# Patient Record
Sex: FEMALE | Race: WHITE | NOT HISPANIC OR LATINO | Employment: OTHER | URBAN - METROPOLITAN AREA
[De-identification: names, ages, dates, MRNs, and addresses within clinical notes are randomized per-mention and may not be internally consistent; named-entity substitution may affect disease eponyms.]

---

## 2018-02-15 ENCOUNTER — CONVERSION ENCOUNTER (OUTPATIENT)
Dept: MAMMOGRAPHY | Facility: HOSPITAL | Age: 63
End: 2018-02-15

## 2018-06-28 ENCOUNTER — CONVERSION ENCOUNTER (OUTPATIENT)
Dept: FAMILY MEDICINE CLINIC | Facility: CLINIC | Age: 63
End: 2018-06-28

## 2018-06-28 ENCOUNTER — OFFICE VISIT CONVERTED (OUTPATIENT)
Dept: FAMILY MEDICINE CLINIC | Facility: CLINIC | Age: 63
End: 2018-06-28
Attending: NURSE PRACTITIONER

## 2018-08-28 ENCOUNTER — OFFICE VISIT CONVERTED (OUTPATIENT)
Dept: FAMILY MEDICINE CLINIC | Facility: CLINIC | Age: 63
End: 2018-08-28
Attending: NURSE PRACTITIONER

## 2018-08-29 ENCOUNTER — OFFICE VISIT CONVERTED (OUTPATIENT)
Dept: GASTROENTEROLOGY | Facility: CLINIC | Age: 63
End: 2018-08-29
Attending: PHYSICIAN ASSISTANT

## 2018-11-13 ENCOUNTER — OFFICE VISIT CONVERTED (OUTPATIENT)
Dept: GASTROENTEROLOGY | Facility: CLINIC | Age: 63
End: 2018-11-13
Attending: PHYSICIAN ASSISTANT

## 2018-12-18 ENCOUNTER — OFFICE VISIT CONVERTED (OUTPATIENT)
Dept: FAMILY MEDICINE CLINIC | Facility: CLINIC | Age: 63
End: 2018-12-18
Attending: NURSE PRACTITIONER

## 2018-12-31 ENCOUNTER — OFFICE VISIT CONVERTED (OUTPATIENT)
Dept: FAMILY MEDICINE CLINIC | Facility: CLINIC | Age: 63
End: 2018-12-31
Attending: NURSE PRACTITIONER

## 2019-03-18 ENCOUNTER — OFFICE VISIT CONVERTED (OUTPATIENT)
Dept: GASTROENTEROLOGY | Facility: CLINIC | Age: 64
End: 2019-03-18
Attending: PHYSICIAN ASSISTANT

## 2019-03-20 ENCOUNTER — OFFICE VISIT CONVERTED (OUTPATIENT)
Dept: FAMILY MEDICINE CLINIC | Facility: CLINIC | Age: 64
End: 2019-03-20
Attending: NURSE PRACTITIONER

## 2019-03-20 ENCOUNTER — HOSPITAL ENCOUNTER (OUTPATIENT)
Dept: FAMILY MEDICINE CLINIC | Facility: CLINIC | Age: 64
Discharge: HOME | End: 2019-03-20
Attending: NURSE PRACTITIONER

## 2019-03-22 LAB
AMOXICILLIN+CLAV SUSC ISLT: 8
AMPICILLIN SUSC ISLT: >=32
AMPICILLIN+SULBAC SUSC ISLT: 16
BACTERIA UR CULT: ABNORMAL
CEFAZOLIN SUSC ISLT: <=4
CEFEPIME SUSC ISLT: <=1
CEFTAZIDIME SUSC ISLT: <=1
CEFTRIAXONE SUSC ISLT: <=1
CEFUROXIME ORAL SUSC ISLT: <=1
CEFUROXIME PARENTER SUSC ISLT: <=1
CIPROFLOXACIN SUSC ISLT: <=0.25
ERTAPENEM SUSC ISLT: <=0.5
GENTAMICIN SUSC ISLT: <=1
LEVOFLOXACIN SUSC ISLT: <=0.12
NITROFURANTOIN SUSC ISLT: <=16
TETRACYCLINE SUSC ISLT: <=1
TMP SMX SUSC ISLT: <=20
TOBRAMYCIN SUSC ISLT: <=1

## 2019-04-24 ENCOUNTER — CONVERSION ENCOUNTER (OUTPATIENT)
Dept: FAMILY MEDICINE CLINIC | Facility: CLINIC | Age: 64
End: 2019-04-24

## 2019-04-24 ENCOUNTER — OFFICE VISIT CONVERTED (OUTPATIENT)
Dept: FAMILY MEDICINE CLINIC | Facility: CLINIC | Age: 64
End: 2019-04-24
Attending: NURSE PRACTITIONER

## 2019-05-02 ENCOUNTER — HOSPITAL ENCOUNTER (OUTPATIENT)
Dept: MAMMOGRAPHY | Facility: HOSPITAL | Age: 64
Discharge: HOME | End: 2019-05-02
Attending: NURSE PRACTITIONER

## 2019-05-06 ENCOUNTER — OFFICE VISIT CONVERTED (OUTPATIENT)
Dept: FAMILY MEDICINE CLINIC | Facility: CLINIC | Age: 64
End: 2019-05-06
Attending: NURSE PRACTITIONER

## 2019-05-06 ENCOUNTER — HOSPITAL ENCOUNTER (OUTPATIENT)
Dept: FAMILY MEDICINE CLINIC | Facility: CLINIC | Age: 64
Discharge: HOME | End: 2019-05-06
Attending: NURSE PRACTITIONER

## 2019-05-06 ENCOUNTER — CONVERSION ENCOUNTER (OUTPATIENT)
Dept: FAMILY MEDICINE CLINIC | Facility: CLINIC | Age: 64
End: 2019-05-06

## 2019-05-10 LAB
CONV LAST MENSTURAL PERIOD: NORMAL
SPECIMEN SOURCE: NORMAL
SPECIMEN SOURCE: NORMAL
THIN PREP CVX: NORMAL

## 2019-05-13 LAB — HPV HYBRID CAPTURE HIGH RISK: NEGATIVE

## 2019-05-31 ENCOUNTER — HOSPITAL ENCOUNTER (OUTPATIENT)
Dept: GENERAL RADIOLOGY | Facility: HOSPITAL | Age: 64
Discharge: HOME | End: 2019-05-31

## 2019-05-31 LAB
ALBUMIN SERPL-MCNC: 4.3 G/DL (ref 3.5–5)
ALBUMIN/GLOB SERPL: 1.5 {RATIO} (ref 1.4–2.6)
ALP SERPL-CCNC: 114 U/L (ref 43–160)
ALT SERPL-CCNC: 14 U/L (ref 10–40)
ANION GAP SERPL CALC-SCNC: 16 MMOL/L (ref 8–19)
AST SERPL-CCNC: 20 U/L (ref 15–50)
BASOPHILS # BLD AUTO: 0.07 10*3/UL (ref 0–0.2)
BASOPHILS NFR BLD AUTO: 1.3 % (ref 0–3)
BILIRUB SERPL-MCNC: 0.56 MG/DL (ref 0.2–1.3)
BUN SERPL-MCNC: 16 MG/DL (ref 5–25)
BUN/CREAT SERPL: 17 {RATIO} (ref 6–20)
CALCIUM SERPL-MCNC: 9.9 MG/DL (ref 8.7–10.4)
CHLORIDE SERPL-SCNC: 103 MMOL/L (ref 99–111)
CONV ABS IMM GRAN: 0.01 10*3/UL (ref 0–0.2)
CONV CO2: 28 MMOL/L (ref 22–32)
CONV IMMATURE GRAN: 0.2 % (ref 0–1.8)
CONV TOTAL PROTEIN: 7.2 G/DL (ref 6.3–8.2)
CREAT UR-MCNC: 0.95 MG/DL (ref 0.5–0.9)
DEPRECATED RDW RBC AUTO: 42.8 FL (ref 36.4–46.3)
EOSINOPHIL # BLD AUTO: 0.29 10*3/UL (ref 0–0.7)
EOSINOPHIL # BLD AUTO: 5.5 % (ref 0–7)
ERYTHROCYTE [DISTWIDTH] IN BLOOD BY AUTOMATED COUNT: 13 % (ref 11.7–14.4)
GFR SERPLBLD BASED ON 1.73 SQ M-ARVRAT: >60 ML/MIN/{1.73_M2}
GLOBULIN UR ELPH-MCNC: 2.9 G/DL (ref 2–3.5)
GLUCOSE SERPL-MCNC: 100 MG/DL (ref 65–99)
HBA1C MFR BLD: 12.8 G/DL (ref 12–16)
HCT VFR BLD AUTO: 38.6 % (ref 37–47)
LYMPHOCYTES # BLD AUTO: 1.49 10*3/UL (ref 1–5)
MCH RBC QN AUTO: 29.7 PG (ref 27–31)
MCHC RBC AUTO-ENTMCNC: 33.2 G/DL (ref 33–37)
MCV RBC AUTO: 89.6 FL (ref 81–99)
MONOCYTES # BLD AUTO: 0.52 10*3/UL (ref 0.2–1.2)
MONOCYTES NFR BLD AUTO: 9.9 % (ref 3–10)
NEUTROPHILS # BLD AUTO: 2.86 10*3/UL (ref 2–8)
NEUTROPHILS NFR BLD AUTO: 54.7 % (ref 30–85)
NRBC CBCN: 0 % (ref 0–0.7)
OSMOLALITY SERPL CALC.SUM OF ELEC: 295 MOSM/KG (ref 273–304)
PLATELET # BLD AUTO: 243 10*3/UL (ref 130–400)
PMV BLD AUTO: 10.5 FL (ref 9.4–12.3)
POTASSIUM SERPL-SCNC: 5.2 MMOL/L (ref 3.5–5.3)
RBC # BLD AUTO: 4.31 10*6/UL (ref 4.2–5.4)
SODIUM SERPL-SCNC: 142 MMOL/L (ref 135–147)
VARIANT LYMPHS NFR BLD MANUAL: 28.4 % (ref 20–45)
WBC # BLD AUTO: 5.24 10*3/UL (ref 4.8–10.8)

## 2019-08-14 ENCOUNTER — OFFICE VISIT CONVERTED (OUTPATIENT)
Dept: FAMILY MEDICINE CLINIC | Facility: CLINIC | Age: 64
End: 2019-08-14
Attending: NURSE PRACTITIONER

## 2019-08-14 ENCOUNTER — CONVERSION ENCOUNTER (OUTPATIENT)
Dept: FAMILY MEDICINE CLINIC | Facility: CLINIC | Age: 64
End: 2019-08-14

## 2019-08-14 ENCOUNTER — HOSPITAL ENCOUNTER (OUTPATIENT)
Dept: FAMILY MEDICINE CLINIC | Facility: CLINIC | Age: 64
Discharge: HOME | End: 2019-08-14
Attending: NURSE PRACTITIONER

## 2019-08-14 LAB
ALBUMIN SERPL-MCNC: 4.3 G/DL (ref 3.5–5)
ALBUMIN/GLOB SERPL: 1.4 {RATIO} (ref 1.4–2.6)
ALP SERPL-CCNC: 83 U/L (ref 43–160)
ALT SERPL-CCNC: 13 U/L (ref 10–40)
ANION GAP SERPL CALC-SCNC: 15 MMOL/L (ref 8–19)
AST SERPL-CCNC: 21 U/L (ref 15–50)
BASOPHILS # BLD AUTO: 0.08 10*3/UL (ref 0–0.2)
BASOPHILS NFR BLD AUTO: 1.6 % (ref 0–3)
BILIRUB SERPL-MCNC: 0.51 MG/DL (ref 0.2–1.3)
BUN SERPL-MCNC: 15 MG/DL (ref 5–25)
BUN/CREAT SERPL: 21 {RATIO} (ref 6–20)
CALCIUM SERPL-MCNC: 9.6 MG/DL (ref 8.7–10.4)
CHLORIDE SERPL-SCNC: 102 MMOL/L (ref 99–111)
CHOLEST SERPL-MCNC: 156 MG/DL (ref 107–200)
CHOLEST/HDLC SERPL: 3.2 {RATIO} (ref 3–6)
CONV ABS IMM GRAN: 0.01 10*3/UL (ref 0–0.2)
CONV CO2: 27 MMOL/L (ref 22–32)
CONV IMMATURE GRAN: 0.2 % (ref 0–1.8)
CONV TOTAL PROTEIN: 7.3 G/DL (ref 6.3–8.2)
CREAT UR-MCNC: 0.72 MG/DL (ref 0.5–0.9)
DEPRECATED RDW RBC AUTO: 47.8 FL (ref 36.4–46.3)
EOSINOPHIL # BLD AUTO: 0.35 10*3/UL (ref 0–0.7)
EOSINOPHIL # BLD AUTO: 7 % (ref 0–7)
ERYTHROCYTE [DISTWIDTH] IN BLOOD BY AUTOMATED COUNT: 14.1 % (ref 11.7–14.4)
GFR SERPLBLD BASED ON 1.73 SQ M-ARVRAT: >60 ML/MIN/{1.73_M2}
GLOBULIN UR ELPH-MCNC: 3 G/DL (ref 2–3.5)
GLUCOSE SERPL-MCNC: 94 MG/DL (ref 65–99)
HCT VFR BLD AUTO: 40.1 % (ref 37–47)
HDLC SERPL-MCNC: 49 MG/DL (ref 40–60)
HGB BLD-MCNC: 12.8 G/DL (ref 12–16)
LDLC SERPL CALC-MCNC: 84 MG/DL (ref 70–100)
LYMPHOCYTES # BLD AUTO: 1.32 10*3/UL (ref 1–5)
LYMPHOCYTES NFR BLD AUTO: 26.5 % (ref 20–45)
MCH RBC QN AUTO: 29.4 PG (ref 27–31)
MCHC RBC AUTO-ENTMCNC: 31.9 G/DL (ref 33–37)
MCV RBC AUTO: 92.2 FL (ref 81–99)
MONOCYTES # BLD AUTO: 0.51 10*3/UL (ref 0.2–1.2)
MONOCYTES NFR BLD AUTO: 10.2 % (ref 3–10)
NEUTROPHILS # BLD AUTO: 2.72 10*3/UL (ref 2–8)
NEUTROPHILS NFR BLD AUTO: 54.5 % (ref 30–85)
NRBC CBCN: 0 % (ref 0–0.7)
OSMOLALITY SERPL CALC.SUM OF ELEC: 291 MOSM/KG (ref 273–304)
PLATELET # BLD AUTO: 258 10*3/UL (ref 130–400)
PMV BLD AUTO: 12.6 FL (ref 9.4–12.3)
POTASSIUM SERPL-SCNC: 4 MMOL/L (ref 3.5–5.3)
RBC # BLD AUTO: 4.35 10*6/UL (ref 4.2–5.4)
SODIUM SERPL-SCNC: 140 MMOL/L (ref 135–147)
TRIGL SERPL-MCNC: 117 MG/DL (ref 40–150)
VLDLC SERPL-MCNC: 23 MG/DL (ref 5–37)
WBC # BLD AUTO: 4.99 10*3/UL (ref 4.8–10.8)

## 2019-08-15 LAB — 25(OH)D3 SERPL-MCNC: 46.9 NG/ML (ref 30–100)

## 2019-10-10 ENCOUNTER — OFFICE VISIT CONVERTED (OUTPATIENT)
Dept: OTOLARYNGOLOGY | Facility: CLINIC | Age: 64
End: 2019-10-10
Attending: OTOLARYNGOLOGY

## 2019-10-10 ENCOUNTER — CONVERSION ENCOUNTER (OUTPATIENT)
Dept: OTOLARYNGOLOGY | Facility: CLINIC | Age: 64
End: 2019-10-10

## 2020-02-14 ENCOUNTER — OFFICE VISIT CONVERTED (OUTPATIENT)
Dept: FAMILY MEDICINE CLINIC | Facility: CLINIC | Age: 65
End: 2020-02-14
Attending: NURSE PRACTITIONER

## 2020-03-31 ENCOUNTER — TELEMEDICINE CONVERTED (OUTPATIENT)
Dept: FAMILY MEDICINE CLINIC | Facility: CLINIC | Age: 65
End: 2020-03-31
Attending: NURSE PRACTITIONER

## 2020-05-15 ENCOUNTER — TELEPHONE CONVERTED (OUTPATIENT)
Dept: FAMILY MEDICINE CLINIC | Facility: CLINIC | Age: 65
End: 2020-05-15
Attending: NURSE PRACTITIONER

## 2020-07-06 ENCOUNTER — CONVERSION ENCOUNTER (OUTPATIENT)
Dept: FAMILY MEDICINE CLINIC | Facility: CLINIC | Age: 65
End: 2020-07-06

## 2020-07-06 ENCOUNTER — OFFICE VISIT CONVERTED (OUTPATIENT)
Dept: FAMILY MEDICINE CLINIC | Facility: CLINIC | Age: 65
End: 2020-07-06
Attending: NURSE PRACTITIONER

## 2020-07-06 ENCOUNTER — HOSPITAL ENCOUNTER (OUTPATIENT)
Dept: FAMILY MEDICINE CLINIC | Facility: CLINIC | Age: 65
Discharge: HOME | End: 2020-07-06
Attending: NURSE PRACTITIONER

## 2020-07-06 LAB
ALBUMIN SERPL-MCNC: 4.1 G/DL (ref 3.5–5)
ALBUMIN/GLOB SERPL: 1.6 {RATIO} (ref 1.4–2.6)
ALP SERPL-CCNC: 80 U/L (ref 43–160)
ALT SERPL-CCNC: 14 U/L (ref 10–40)
ANION GAP SERPL CALC-SCNC: 14 MMOL/L (ref 8–19)
AST SERPL-CCNC: 20 U/L (ref 15–50)
BASOPHILS # BLD AUTO: 0.08 10*3/UL (ref 0–0.2)
BASOPHILS NFR BLD AUTO: 1.5 % (ref 0–3)
BILIRUB SERPL-MCNC: 0.3 MG/DL (ref 0.2–1.3)
BUN SERPL-MCNC: 16 MG/DL (ref 5–25)
BUN/CREAT SERPL: 19 {RATIO} (ref 6–20)
CALCIUM SERPL-MCNC: 9.2 MG/DL (ref 8.7–10.4)
CHLORIDE SERPL-SCNC: 106 MMOL/L (ref 99–111)
CHOLEST SERPL-MCNC: 145 MG/DL (ref 107–200)
CHOLEST/HDLC SERPL: 2.7 {RATIO} (ref 3–6)
CONV ABS IMM GRAN: 0.01 10*3/UL (ref 0–0.2)
CONV CO2: 24 MMOL/L (ref 22–32)
CONV IMMATURE GRAN: 0.2 % (ref 0–1.8)
CONV TOTAL PROTEIN: 6.6 G/DL (ref 6.3–8.2)
CREAT UR-MCNC: 0.86 MG/DL (ref 0.5–0.9)
DEPRECATED RDW RBC AUTO: 45.3 FL (ref 36.4–46.3)
EOSINOPHIL # BLD AUTO: 0.31 10*3/UL (ref 0–0.7)
EOSINOPHIL # BLD AUTO: 5.9 % (ref 0–7)
ERYTHROCYTE [DISTWIDTH] IN BLOOD BY AUTOMATED COUNT: 13.2 % (ref 11.7–14.4)
GFR SERPLBLD BASED ON 1.73 SQ M-ARVRAT: >60 ML/MIN/{1.73_M2}
GLOBULIN UR ELPH-MCNC: 2.5 G/DL (ref 2–3.5)
GLUCOSE SERPL-MCNC: 106 MG/DL (ref 65–99)
HCT VFR BLD AUTO: 34.5 % (ref 37–47)
HDLC SERPL-MCNC: 54 MG/DL (ref 40–60)
HGB BLD-MCNC: 11.1 G/DL (ref 12–16)
LDLC SERPL CALC-MCNC: 74 MG/DL (ref 70–100)
LYMPHOCYTES # BLD AUTO: 1.28 10*3/UL (ref 1–5)
LYMPHOCYTES NFR BLD AUTO: 24.3 % (ref 20–45)
MCH RBC QN AUTO: 30.5 PG (ref 27–31)
MCHC RBC AUTO-ENTMCNC: 32.2 G/DL (ref 33–37)
MCV RBC AUTO: 94.8 FL (ref 81–99)
MONOCYTES # BLD AUTO: 0.44 10*3/UL (ref 0.2–1.2)
MONOCYTES NFR BLD AUTO: 8.3 % (ref 3–10)
NEUTROPHILS # BLD AUTO: 3.15 10*3/UL (ref 2–8)
NEUTROPHILS NFR BLD AUTO: 59.8 % (ref 30–85)
NRBC CBCN: 0 % (ref 0–0.7)
OSMOLALITY SERPL CALC.SUM OF ELEC: 292 MOSM/KG (ref 273–304)
PLATELET # BLD AUTO: 232 10*3/UL (ref 130–400)
PMV BLD AUTO: 12.3 FL (ref 9.4–12.3)
POTASSIUM SERPL-SCNC: 4 MMOL/L (ref 3.5–5.3)
RBC # BLD AUTO: 3.64 10*6/UL (ref 4.2–5.4)
SODIUM SERPL-SCNC: 140 MMOL/L (ref 135–147)
TRIGL SERPL-MCNC: 87 MG/DL (ref 40–150)
VLDLC SERPL-MCNC: 17 MG/DL (ref 5–37)
WBC # BLD AUTO: 5.27 10*3/UL (ref 4.8–10.8)

## 2020-07-07 LAB
EST. AVERAGE GLUCOSE BLD GHB EST-MCNC: 108 MG/DL
FOLATE SERPL-MCNC: 14.6 NG/ML (ref 4.8–20)
HBA1C MFR BLD: 5.4 % (ref 3.5–5.7)
T4 FREE SERPL-MCNC: 1 NG/DL (ref 0.9–1.8)
TSH SERPL-ACNC: 3.21 M[IU]/L (ref 0.27–4.2)
VIT B12 SERPL-MCNC: 502 PG/ML (ref 211–911)

## 2020-07-13 ENCOUNTER — CONVERSION ENCOUNTER (OUTPATIENT)
Dept: FAMILY MEDICINE CLINIC | Facility: CLINIC | Age: 65
End: 2020-07-13

## 2020-07-13 ENCOUNTER — HOSPITAL ENCOUNTER (OUTPATIENT)
Dept: FAMILY MEDICINE CLINIC | Facility: CLINIC | Age: 65
Discharge: HOME | End: 2020-07-13
Attending: NURSE PRACTITIONER

## 2020-07-13 ENCOUNTER — OFFICE VISIT CONVERTED (OUTPATIENT)
Dept: FAMILY MEDICINE CLINIC | Facility: CLINIC | Age: 65
End: 2020-07-13
Attending: NURSE PRACTITIONER

## 2020-07-13 LAB
BASOPHILS # BLD AUTO: 0.08 10*3/UL (ref 0–0.2)
BASOPHILS NFR BLD AUTO: 1.6 % (ref 0–3)
CONV ABS IMM GRAN: 0.01 10*3/UL (ref 0–0.2)
CONV IMMATURE GRAN: 0.2 % (ref 0–1.8)
DEPRECATED RDW RBC AUTO: 44 FL (ref 36.4–46.3)
EOSINOPHIL # BLD AUTO: 0.22 10*3/UL (ref 0–0.7)
EOSINOPHIL # BLD AUTO: 4.4 % (ref 0–7)
ERYTHROCYTE [DISTWIDTH] IN BLOOD BY AUTOMATED COUNT: 12.8 % (ref 11.7–14.4)
HCT VFR BLD AUTO: 37.7 % (ref 37–47)
HGB BLD-MCNC: 12.2 G/DL (ref 12–16)
LYMPHOCYTES # BLD AUTO: 1.39 10*3/UL (ref 1–5)
LYMPHOCYTES NFR BLD AUTO: 27.5 % (ref 20–45)
MCH RBC QN AUTO: 30.5 PG (ref 27–31)
MCHC RBC AUTO-ENTMCNC: 32.4 G/DL (ref 33–37)
MCV RBC AUTO: 94.3 FL (ref 81–99)
MONOCYTES # BLD AUTO: 0.49 10*3/UL (ref 0.2–1.2)
MONOCYTES NFR BLD AUTO: 9.7 % (ref 3–10)
NEUTROPHILS # BLD AUTO: 2.86 10*3/UL (ref 2–8)
NEUTROPHILS NFR BLD AUTO: 56.6 % (ref 30–85)
NRBC CBCN: 0 % (ref 0–0.7)
PLATELET # BLD AUTO: 266 10*3/UL (ref 130–400)
PMV BLD AUTO: 12.3 FL (ref 9.4–12.3)
RBC # BLD AUTO: 4 10*6/UL (ref 4.2–5.4)
WBC # BLD AUTO: 5.05 10*3/UL (ref 4.8–10.8)

## 2020-07-14 LAB
25(OH)D3 SERPL-MCNC: 49.8 NG/ML (ref 30–100)
FERRITIN SERPL-MCNC: 21 NG/ML (ref 10–200)
IRON SATN MFR SERPL: 20 % (ref 20–55)
IRON SERPL-MCNC: 62 UG/DL (ref 60–170)
TIBC SERPL-MCNC: 312 UG/DL (ref 245–450)
TRANSFERRIN SERPL-MCNC: 218 MG/DL (ref 250–380)

## 2020-07-16 ENCOUNTER — HOSPITAL ENCOUNTER (OUTPATIENT)
Dept: FAMILY MEDICINE CLINIC | Facility: CLINIC | Age: 65
Discharge: HOME | End: 2020-07-16
Attending: NURSE PRACTITIONER

## 2020-07-16 ENCOUNTER — OFFICE VISIT CONVERTED (OUTPATIENT)
Dept: FAMILY MEDICINE CLINIC | Facility: CLINIC | Age: 65
End: 2020-07-16
Attending: NURSE PRACTITIONER

## 2020-07-19 LAB
BACTERIA SPEC AEROBE CULT: ABNORMAL
CIPROFLOXACIN SUSC ISLT: <=0.5
CLINDAMYCIN SUSC ISLT: >=4
DAPTOMYCIN SUSC ISLT: 0.25
DOXYCYCLINE SUSC ISLT: <=0.5
ERYTHROMYCIN SUSC ISLT: <=0.25
GENTAMICIN SUSC ISLT: <=0.5
LEVOFLOXACIN SUSC ISLT: 0.25
OXACILLIN SUSC ISLT: <=0.25
RIFAMPIN SUSC ISLT: <=0.5
TETRACYCLINE SUSC ISLT: <=1
TIGECYCLINE SUSC ISLT: <=0.12
TMP SMX SUSC ISLT: <=10
VANCOMYCIN SUSC ISLT: 1

## 2020-07-20 ENCOUNTER — OFFICE VISIT CONVERTED (OUTPATIENT)
Dept: FAMILY MEDICINE CLINIC | Facility: CLINIC | Age: 65
End: 2020-07-20
Attending: NURSE PRACTITIONER

## 2020-07-20 ENCOUNTER — CONVERSION ENCOUNTER (OUTPATIENT)
Dept: FAMILY MEDICINE CLINIC | Facility: CLINIC | Age: 65
End: 2020-07-20

## 2020-08-24 ENCOUNTER — OFFICE VISIT CONVERTED (OUTPATIENT)
Dept: FAMILY MEDICINE CLINIC | Facility: CLINIC | Age: 65
End: 2020-08-24
Attending: NURSE PRACTITIONER

## 2020-08-24 ENCOUNTER — CONVERSION ENCOUNTER (OUTPATIENT)
Dept: FAMILY MEDICINE CLINIC | Facility: CLINIC | Age: 65
End: 2020-08-24

## 2020-08-27 ENCOUNTER — HOSPITAL ENCOUNTER (OUTPATIENT)
Dept: FAMILY MEDICINE CLINIC | Facility: CLINIC | Age: 65
Discharge: HOME | End: 2020-08-27
Attending: NURSE PRACTITIONER

## 2020-08-27 LAB
APPEARANCE UR: ABNORMAL
BILIRUB UR QL: NEGATIVE
COLOR UR: YELLOW
CONV BACTERIA: ABNORMAL
CONV COLLECTION SOURCE (UA): ABNORMAL
CONV HYALINE CASTS IN URINE MICRO: ABNORMAL /[LPF]
CONV UROBILINOGEN IN URINE BY AUTOMATED TEST STRIP: 1 {EHRLICHU}/DL (ref 0.1–1)
GLUCOSE UR QL: NEGATIVE MG/DL
HGB UR QL STRIP: NEGATIVE
KETONES UR QL STRIP: NEGATIVE MG/DL
LEUKOCYTE ESTERASE UR QL STRIP: ABNORMAL
NITRITE UR QL STRIP: POSITIVE
PH UR STRIP.AUTO: 7 [PH] (ref 5–8)
PROT UR QL: NEGATIVE MG/DL
RBC #/AREA URNS HPF: ABNORMAL /[HPF]
SP GR UR: 1.01 (ref 1–1.03)
WBC #/AREA URNS HPF: ABNORMAL /[HPF]

## 2020-08-29 LAB — BACTERIA SPEC AEROBE CULT: NORMAL

## 2020-09-24 ENCOUNTER — HOSPITAL ENCOUNTER (OUTPATIENT)
Dept: MAMMOGRAPHY | Facility: HOSPITAL | Age: 65
Discharge: HOME | End: 2020-09-24
Attending: NURSE PRACTITIONER

## 2020-12-17 ENCOUNTER — HOSPITAL ENCOUNTER (OUTPATIENT)
Dept: PREADMISSION TESTING | Facility: HOSPITAL | Age: 65
Discharge: HOME | End: 2020-12-17
Attending: ORTHOPAEDIC SURGERY

## 2020-12-17 ENCOUNTER — OFFICE VISIT CONVERTED (OUTPATIENT)
Dept: ORTHOPEDIC SURGERY | Facility: CLINIC | Age: 65
End: 2020-12-17
Attending: ORTHOPAEDIC SURGERY

## 2020-12-17 LAB — SARS-COV-2 RNA SPEC QL NAA+PROBE: NOT DETECTED

## 2020-12-18 ENCOUNTER — HOSPITAL ENCOUNTER (OUTPATIENT)
Dept: PERIOP | Facility: HOSPITAL | Age: 65
Setting detail: HOSPITAL OUTPATIENT SURGERY
Discharge: HOME | End: 2020-12-18
Attending: ORTHOPAEDIC SURGERY

## 2021-01-05 ENCOUNTER — OFFICE VISIT CONVERTED (OUTPATIENT)
Dept: ORTHOPEDIC SURGERY | Facility: CLINIC | Age: 66
End: 2021-01-05
Attending: PHYSICIAN ASSISTANT

## 2021-01-05 ENCOUNTER — CONVERSION ENCOUNTER (OUTPATIENT)
Dept: ORTHOPEDIC SURGERY | Facility: CLINIC | Age: 66
End: 2021-01-05

## 2021-01-26 ENCOUNTER — OFFICE VISIT CONVERTED (OUTPATIENT)
Dept: ORTHOPEDIC SURGERY | Facility: CLINIC | Age: 66
End: 2021-01-26
Attending: PHYSICIAN ASSISTANT

## 2021-02-25 ENCOUNTER — OFFICE VISIT CONVERTED (OUTPATIENT)
Dept: ORTHOPEDIC SURGERY | Facility: CLINIC | Age: 66
End: 2021-02-25
Attending: ORTHOPAEDIC SURGERY

## 2021-03-01 ENCOUNTER — HOSPITAL ENCOUNTER (OUTPATIENT)
Dept: OTHER | Facility: HOSPITAL | Age: 66
Setting detail: RECURRING SERIES
Discharge: HOME | End: 2021-03-29
Attending: ORTHOPAEDIC SURGERY

## 2021-03-09 ENCOUNTER — OFFICE VISIT CONVERTED (OUTPATIENT)
Dept: FAMILY MEDICINE CLINIC | Facility: CLINIC | Age: 66
End: 2021-03-09
Attending: NURSE PRACTITIONER

## 2021-03-09 ENCOUNTER — CONVERSION ENCOUNTER (OUTPATIENT)
Dept: FAMILY MEDICINE CLINIC | Facility: CLINIC | Age: 66
End: 2021-03-09

## 2021-03-09 ENCOUNTER — HOSPITAL ENCOUNTER (OUTPATIENT)
Dept: FAMILY MEDICINE CLINIC | Facility: CLINIC | Age: 66
Discharge: HOME | End: 2021-03-09
Attending: NURSE PRACTITIONER

## 2021-03-09 LAB
ALBUMIN SERPL-MCNC: 4.1 G/DL (ref 3.5–5)
ALBUMIN/GLOB SERPL: 1.4 {RATIO} (ref 1.4–2.6)
ALP SERPL-CCNC: 116 U/L (ref 43–160)
ALT SERPL-CCNC: 15 U/L (ref 10–40)
ANION GAP SERPL CALC-SCNC: 14 MMOL/L (ref 8–19)
AST SERPL-CCNC: 23 U/L (ref 15–50)
BASOPHILS # BLD AUTO: 0.08 10*3/UL (ref 0–0.2)
BASOPHILS NFR BLD AUTO: 1.4 % (ref 0–3)
BILIRUB SERPL-MCNC: 0.23 MG/DL (ref 0.2–1.3)
BUN SERPL-MCNC: 24 MG/DL (ref 5–25)
BUN/CREAT SERPL: 30 {RATIO} (ref 6–20)
CALCIUM SERPL-MCNC: 9.4 MG/DL (ref 8.7–10.4)
CHLORIDE SERPL-SCNC: 102 MMOL/L (ref 99–111)
CHOLEST SERPL-MCNC: 171 MG/DL (ref 107–200)
CHOLEST/HDLC SERPL: 3.4 {RATIO} (ref 3–6)
CONV ABS IMM GRAN: 0.02 10*3/UL (ref 0–0.2)
CONV CO2: 28 MMOL/L (ref 22–32)
CONV IMMATURE GRAN: 0.4 % (ref 0–1.8)
CONV TOTAL PROTEIN: 7.1 G/DL (ref 6.3–8.2)
CREAT UR-MCNC: 0.81 MG/DL (ref 0.5–0.9)
DEPRECATED RDW RBC AUTO: 44.4 FL (ref 36.4–46.3)
EOSINOPHIL # BLD AUTO: 0.22 10*3/UL (ref 0–0.7)
EOSINOPHIL # BLD AUTO: 3.9 % (ref 0–7)
ERYTHROCYTE [DISTWIDTH] IN BLOOD BY AUTOMATED COUNT: 13.2 % (ref 11.7–14.4)
GFR SERPLBLD BASED ON 1.73 SQ M-ARVRAT: >60 ML/MIN/{1.73_M2}
GLOBULIN UR ELPH-MCNC: 3 G/DL (ref 2–3.5)
GLUCOSE SERPL-MCNC: 92 MG/DL (ref 65–99)
HCT VFR BLD AUTO: 40 % (ref 37–47)
HDLC SERPL-MCNC: 50 MG/DL (ref 40–60)
HGB BLD-MCNC: 13.1 G/DL (ref 12–16)
IRON SATN MFR SERPL: 27 % (ref 20–55)
IRON SERPL-MCNC: 80 UG/DL (ref 60–170)
LDLC SERPL CALC-MCNC: 81 MG/DL (ref 70–100)
LYMPHOCYTES # BLD AUTO: 1.83 10*3/UL (ref 1–5)
LYMPHOCYTES NFR BLD AUTO: 32.4 % (ref 20–45)
MCH RBC QN AUTO: 29.7 PG (ref 27–31)
MCHC RBC AUTO-ENTMCNC: 32.8 G/DL (ref 33–37)
MCV RBC AUTO: 90.7 FL (ref 81–99)
MONOCYTES # BLD AUTO: 0.6 10*3/UL (ref 0.2–1.2)
MONOCYTES NFR BLD AUTO: 10.6 % (ref 3–10)
NEUTROPHILS # BLD AUTO: 2.89 10*3/UL (ref 2–8)
NEUTROPHILS NFR BLD AUTO: 51.3 % (ref 30–85)
NRBC CBCN: 0 % (ref 0–0.7)
OSMOLALITY SERPL CALC.SUM OF ELEC: 294 MOSM/KG (ref 273–304)
PLATELET # BLD AUTO: 268 10*3/UL (ref 130–400)
PMV BLD AUTO: 11.9 FL (ref 9.4–12.3)
POTASSIUM SERPL-SCNC: 4.2 MMOL/L (ref 3.5–5.3)
RBC # BLD AUTO: 4.41 10*6/UL (ref 4.2–5.4)
SODIUM SERPL-SCNC: 140 MMOL/L (ref 135–147)
TIBC SERPL-MCNC: 299 UG/DL (ref 245–450)
TRANSFERRIN SERPL-MCNC: 209 MG/DL (ref 250–380)
TRIGL SERPL-MCNC: 202 MG/DL (ref 40–150)
VLDLC SERPL-MCNC: 40 MG/DL (ref 5–37)
WBC # BLD AUTO: 5.64 10*3/UL (ref 4.8–10.8)

## 2021-03-10 LAB
FERRITIN SERPL-MCNC: 23 NG/ML (ref 10–200)
FOLATE SERPL-MCNC: 12.4 NG/ML (ref 4.8–20)
VIT B12 SERPL-MCNC: 510 PG/ML (ref 211–911)

## 2021-04-01 ENCOUNTER — HOSPITAL ENCOUNTER (OUTPATIENT)
Dept: VACCINE CLINIC | Facility: HOSPITAL | Age: 66
Discharge: HOME | End: 2021-04-01
Attending: INTERNAL MEDICINE

## 2021-04-22 ENCOUNTER — HOSPITAL ENCOUNTER (OUTPATIENT)
Dept: VACCINE CLINIC | Facility: HOSPITAL | Age: 66
Discharge: HOME | End: 2021-04-22
Attending: INTERNAL MEDICINE

## 2021-05-10 NOTE — H&P
History and Physical      Patient Name: Vanna Gil   Patient ID: [id number]   Sex: Female   YOB: 1955    Primary Care Provider: Ibeth REINA   Referring Provider: Ibeth REINA    Visit Date: December 17, 2020    Provider: Son Quiroz MD   Location: [facility]   Location Address: [address] [place] [address]  [id number]   Location Phone: [phone]          Chief Complaint  · Right Wrist Injury      History Of Present Illness  Vanna Gil is a 65 year old /White female who presents today to [facility].      Patient presents today for an evaluation of right wrist pain. Yesterday she slipped on grass, on an outstretched hand, resulting in an injury. Patient went to the ED and had X-rays done which revealed a right distal radius fracture that was displaced. Patient had post-reduction films. Patient states moderate amount of pain and presents today in a splint and a sling. Patient denies any numbness and tingling.       Past Medical History  Anemia; Anxiety; Arthritis; Asthma; Depression; Depression; Essential hypertension; Hearing loss; Hemorrhoids; Hernia; Hyperlipidemia; Hypertension; OAB (overactive bladder); Reflux Disease; Shortness of Breath         Past Surgical History  Appendectomy; Breast biopsy, left breast; Colonoscopy; EGD; Hernia Repair; Knee Replacement; MENISCUS TEAR; Spinal Surgery; Tonsilectomy         Medication List  atorvastatin 40 mg oral tablet; calcium carbonate 500 mg calcium (1,250 mg) oral tablet; carvedilol 3.125 mg oral tablet; Detrol LA 4 mg oral capsule,extended release 24hr; hydroxyzine HCl 25 mg oral tablet; loratadine 10 mg oral tablet; Macrobid 100 mg oral capsule; nifedipine 60 mg oral tablet extended release; Vitamin D3 50 mcg (2,000 unit) oral capsule; Zoloft 50 mg oral tablet         Allergy List  Aleve; Levaquin; Medrol       Allergies Reconciled  Family Medical History  - No Family History of  "Colorectal Cancer; Family history of breast cancer; Family history of heart disease         Social History  Alcohol (Current some day); Sedentary; Tobacco (Former)         Immunizations  Name Date Admin   Influenza 11/02/2020   Influenza 10/23/2019   Influenza 11/02/2018   Tvgdzlllx47 07/20/2020   Prevnar 13 09/07/2017         Review of Systems  · Constitutional  o Denies  o : fever, chills, weight loss  · Cardiovascular  o Denies  o : chest pain, shortness of breath  · Gastrointestinal  o Denies  o : liver disease, heartburn, nausea, blood in stools  · Genitourinary  o Denies  o : painful urination, blood in urine  · Integument  o Denies  o : rash, itching  · Neurologic  o Denies  o : headache, weakness, loss of consciousness  · Musculoskeletal  o Denies  o : painful, swollen joints  · Psychiatric  o Denies  o : drug/alcohol addiction, anxiety, depression      Vitals  Date Time BP Position Site L\R Cuff Size HR RR TEMP (F) WT  HT  BMI kg/m2 BSA m2 O2 Sat FR L/min FiO2        12/17/2020 08:53 AM      69 - R   184lbs 0oz 5'  1\" 34.77 1.9 96 %            Physical Examination  · Constitutional  o Appearance  o : well developed, well-nourished, no obvious deformities present  · Head and Face  o Head  o :   § Inspection  § : normocephalic  o Face  o :   § Inspection  § : no facial lesions  · Eyes  o Conjunctivae  o : conjunctivae normal  o Sclerae  o : sclerae white  · Ears, Nose, Mouth and Throat  o Ears  o :   § External Ears  § : appearance within normal limits  § Hearing  § : intact  o Nose  o :   § External Nose  § : appearance normal  · Neck  o Inspection/Palpation  o : normal appearance  o Range of Motion  o : full range of motion  · Respiratory  o Respiratory Effort  o : breathing unlabored  o Inspection of Chest  o : normal appearance  o Auscultation of Lungs  o : no audible wheezing or rales  · Cardiovascular  o Heart  o : regular rate  · Gastrointestinal  o Abdominal Examination  o : soft and " non-tender  · Skin and Subcutaneous Tissue  o General Inspection  o : intact, no rashes  · Psychiatric  o General  o : Alert and oriented x3  o Judgement and Insight  o : judgment and insight intact  o Mood and Affect  o : mood normal, affect appropriate  · Right Wrist  o Inspection  o : Sensation grossly intact. Neurovascular intact. Skin intact. Patient able to wiggle fingers. Good capillary refill. Moderate swelling. Tenderness over the radius. 2+ radial and ulnar pulses.   · Imaging  o Imaging  o : 12/16/20 [xray] 1. Acute comminuted and mildly impacted fracture of the distal radius with marked dorsal angulation at the fracture site. [POST REDUCTION]:Post reduction images submitted with overlying splint with demonstration of improved alignment of the distal radius and ulna as detailed above.           Assessment  · Right distal radius fracture     814.01  · Right wrist pain     719.43/M25.531      Plan  · Medications  o Medications have been Reconciled  o Transition of Care or Provider Policy  · Instructions  o Dr. Quiroz saw and examined the patient and agrees with plan.   o X-rays reviewed by Dr. Quiroz.  o Reviewed the patient's Past Medical, Social, and Family history as well as the ROS at today's visit, no changes.  o Call or return if worsening symptoms.  o Discussed surgery.  o Risks/benefits discussed with patient including, but not limited to: infection, bleeding, neurovascular damage, malunion, nonunion, aesthetic deformity, need for further surgery, and death.  o Surgery pamphlet given.  o Follow Up Post-op.  o This note was transcribed by Solange Rodgers. ulises/keila  o Discussed treatment plans and diagnosis. We discussed open reduction and internal fixation of right distal radius fracture and patient wishes to proceed.             Electronically Signed by: Solange Rodgers-, Other -Author on December 17, 2020 09:52:03 AM  Electronically Co-signed by: Son Quiroz MD -Reviewer on December 18,  2020 03:16:12 PM

## 2021-05-12 NOTE — PROGRESS NOTES
Quick Note      Patient Name: Vanna Gil   Patient ID: [id number]   Sex: Female   YOB: 1955    Primary Care Provider: Ibeth REINA   Referring Provider: Ibeth REINA    Visit Date: March 31, 2020    Provider: LATOSHA Patten   Location: [facility]   Location Address: [address] [place] [address], [address]  [id number]   Location Phone: [phone]          History Of Present Illness  TELEHEALTH VISIT  Chief Complaint: Anxiety and depression   Vanna Gil is a 64 year old /White female who is presenting for evaluation via telehealth visit. Verbal consent obtained before beginning visit.   Provider spent 19 minutes with the patient during telehealth visit.   The following staff were present during this visit: Juan David Horvath CMA and LATOSHA Curry   Past Medical History/Overview of Patient Symptoms     She is complaining of increasing anxiety and depression.  She was seen at the ER on 3/27/2024 chest tightness, all her tests were negative.  She feels that she is more anxious lately due to the pandemic.  She has a history of depression and anxiety and was started on Zoloft 25 mg on her last visit on 2/14/2020.  She states it has helped with her depression, states she does not feel as depressed.  She is seeing a therapist and states her therapist noticed that she has improved.  No suicidal or homicidal ideation.    ER records were reviewed, labs within normal limits, chest x-ray within normal limits, EKG within normal limits.           Assessment  · Depression     311/F32.9  · Anxiety     300.02/F41.1    Problems Reconciled  Plan  · Orders  o Physician Telephone Evaluation, 11-20 minutes (55391) - 300.02/F41.1, 311/F32.9 - 03/31/2020  · Medications  o hydroxyzine HCl 25 mg oral tablet   SIG: take 1 tablet (25 mg) by oral route 2 times per day as needed for anxiety   DISP: (180) tablets with 0 refills  Prescribed on 03/31/2020     o Zoloft 50  mg oral tablet   SIG: take 1 tablet (50 mg) by oral route once daily for 90 days   DISP: (90) tablets with 0 refills  Adjusted on 03/31/2020     · Instructions  o Patient was given an SSRI/SSNRI medication and warned of possible side effects of the medication including potential for increased risk of suicidal thoughts and feelings. Patient was instructed that if they begin to exhibit any of these effects they will discontinue the medication immediately and contact our office or the ER ASAP.  o Plan Of Care:   o Patient instructed to seek medical attention urgently for new or worsening symptoms.  o Patient was educated/instructed on their diagnosis, treatment and medications.  o Patient instructed/educated on their diet and exercise program.  o Call the office with any concerns or questions.  · Disposition  o Return to clinic in 6 weeks     Depression anxiety not well controlled, will increase Zoloft 50 mg and add hydroxyzine 25 mg twice daily as needed anxiety.  Discussed with patient possibility of drowsiness with hydroxyzine and not to take it if she is going to be driving.             Electronically Signed by: LATOSHA Patten -Author on March 31, 2020 10:41:22 AM

## 2021-05-13 NOTE — PROGRESS NOTES
Progress Note      Patient Name: Vanna Gil   Patient ID: [id number]   Sex: Female   YOB: 1955    Primary Care Provider: Ibeth REINA   Referring Provider: Ibeth REINA    Visit Date: August 24, 2020    Provider: LATOSHA Patten   Location: [facility]   Location Address: [address] [place] [address], [address] [place] [address]  [id number]   Location Phone: [phone]          Chief Complaint  · medication refills  · still feels lightheaded      History Of Present Illness  Vanna Gil is a 65 year old /White female who presents for evaluation and treatment of:      Follow-up and med refills.    She states she is still experiencing lightheadedness.  She seems to have difficulty hearing today.  She denies any earaches.  She states she has seen ENT in the past.  She declines to be referred to ENT again at this time.    Hypertension: Blood pressure stable 116/72, on carvedilol 3.125 mg twice daily and nifedipine 60 mg daily.    She is complaining that her urine is cloudy and wants to have her urine checked.  She denies any dysuria.  However, she is unable to urinate while in the office today.    She is also complaining of loose stools for the past week.       Past Medical History  Disease Name Date Onset Notes   Anemia --  --    Anxiety --  --    Arthritis --  --    Asthma --  --    Depression --  --    Depression 02/14/2020 --    Essential hypertension 02/14/2020 --    Hearing loss --  --    Hemorrhoids --  --    Hernia --  --    Hyperlipidemia --  --    Hypertension --  --    OAB (overactive bladder) 02/14/2020 --    Reflux Disease --  --    Shortness of Breath --  --          Past Surgical History  Procedure Name Date Notes   Appendectomy --  --    Breast biopsy, left breast --  --    Colonoscopy 2018 9/20/2018- [facility] Repeat in 5 years   EGD 2018    Hernia Repair --  --    Knee Replacement --  --    MENISCUS TEAR --  --    Spinal Surgery --  --    Tonsilectomy --   --          Medication List  Name Date Started Instructions   atorvastatin 40 mg oral tablet 08/24/2020 take 1 tablet (40 mg) by oral route once daily at bedtime for 90 days   calcium carbonate 500 mg calcium (1,250 mg) oral tablet 02/14/2020 take 1 tablet by oral route 2 times a day for 90 days   carvedilol 3.125 mg oral tablet 08/24/2020 take 1 tablet (3.125 mg) by oral route 2 times per day with food for 90 days   Detrol LA 4 mg oral capsule,extended release 24hr 08/24/2020 take 1 capsule (4 mg) by oral route once daily for 90 days   hydroxyzine HCl 25 mg oral tablet 03/31/2020 take 1 tablet (25 mg) by oral route 2 times per day as needed for anxiety   loratadine 10 mg oral tablet 07/06/2020 take 1 tablet (10 mg) by oral route once daily for 90 days   nifedipine 60 mg oral tablet extended release 08/24/2020 take 1 tablet (60 mg) by oral route once daily for 90 days   Vitamin D3 50 mcg (2,000 unit) oral capsule 08/24/2020 take 1 capsule by oral route daily for 90 days   Zoloft 50 mg oral tablet 07/13/2020 take 1 tablet (50 mg) by oral route once daily for 90 days         Allergy List  Allergen Name Date Reaction Notes   Aleve --  --  --    Levaquin --  --  --    Medrol --  --  --          Family Medical History  Disease Name Relative/Age Notes   - No Family History of Colorectal Cancer  --    Family history of breast cancer Mother/   --    Family history of heart disease Brother/  Father/   --          Social History  Finding Status Start/Stop Quantity Notes   Alcohol Current some day --/-- --  --    Sedentary --  --/-- --  --    Tobacco Former --/-- --  --          Immunizations  NameDate Admin Mfg Trade Name Lot Number Route Inj VIS Given VIS Publication   Ratluzcmb76/23/2019 PMC Fluzone Quadrivalent [id number] IM LD 10/23/2019    Comments: Patient tolerated injection well, left in stable condition.   Zwitcexxy20/02/2018 PMC Fluzone Quadrivalent [id number] IM RD 11/02/2018 08/07/2015   Comments: Patient tolerated  "well. Left stable.   Irruwpkhb2905/20/2020 MSD PNEUMOVAX 23 [id number]  RD 07/20/2020    Comments: Patient tolerated injection well, left in stable condition.   Prevnar 1309/07/2017 [facility] PREVNAR 13 [id number]  RD 09/07/2017 11/05/2015   Comments: Patient tolerated inj well and left the office in stable condition.         Review of Systems  · Constitutional  o Denies  o : fever, fatigue, weight loss, weight gain  · HENT  o Admits  o : lightheadedness, hearing loss  o Denies  o : headaches, sinus pain, sore throat, ear pain, ear fullness  · Cardiovascular  o Denies  o : lower extremity edema, claudication, chest pressure, palpitations  · Respiratory  o Denies  o : shortness of breath, wheezing, cough, hemoptysis, dyspnea on exertion  · Gastrointestinal  o Admits  o : diarrhea  o Denies  o : nausea, vomiting, constipation, abdominal pain  · Genitourinary  o Admits  o : frequency, change in urine color  o Denies  o : urgency, dysuria      Vitals  Date Time BP Position Site L\R Cuff Size HR RR TEMP (F) WT  HT  BMI kg/m2 BSA m2 O2 Sat        08/24/2020 11:09 /72 Sitting    75 - R  97.7 186lbs 2oz 5'  1\" 35.17 1.91 95 %          Physical Examination  · Constitutional  o Appearance  o : no acute distress, well-nourished  · Head and Face  o Head  o :   § Inspection  § : atraumatic, normocephalic  · Ears, Nose, Mouth and Throat  o Ears  o :   § External Ears  § : normal  § Otoscopic Examination  § : tympanic membrane dull in appearance-bilaterally   · Neck  o Thyroid  o : gland size normal, nontender, no nodules or masses present on palpation, symmetric  · Respiratory  o Respiratory Effort  o : breathing comfortably, symmetric chest rise  o Auscultation of Lungs  o : clear to asculatation bilaterally, no wheezes, rales, or rhonchii  · Cardiovascular  o Heart  o :   § Auscultation of Heart  § : regular rate and rhythm, no murmurs, rubs, or gallops  o Peripheral Vascular System  o :   § Extremities  § : no " edema  · Lymphatic  o Neck  o : no lymphadenopathy present  · Neurologic  o Mental Status Examination  o :   § Orientation  § : grossly oriented to person, place and time  o Gait and Station  o :   § Gait Screening  § : normal gait  · Psychiatric  o General  o : normal mood and affect              Assessment  · Diarrhea     787.91/R19.7  · Essential hypertension     401.9/I10  · Dizziness     780.4/R42  · Cloudy urine     791.9/R82.90    Problems Reconciled  Plan  · Orders  o Giardia and Cryptosporidium Enzyme Immunoassay [facility] (10086, 27613) - 787.91/R19.7 - 08/24/2020  o Stool culture and sensitivity (87367) - 787.91/R19.7 - 08/24/2020  o C difficile Toxigenic Assay (PCR) [facility] (86244) - 787.91/R19.7 - 08/24/2020  o Lactoferrin (Fecal) Qualitative (28964) - 787.91/R19.7 - 08/24/2020  o Urinalysis with Reflex Microscopy if abnormal ([facility]) (53959) - 791.9/R82.90 - 08/24/2020  o ACO-39: Current medications updated and reviewed () - - 08/24/2020  o ACO-19: Colorectal cancer screening results documented and reviewed (3017F) - - 08/24/2020 09/20/18 Repeat in 5 years  · Medications  o atorvastatin 40 mg oral tablet   SIG: take 1 tablet (40 mg) by oral route once daily at bedtime for 90 days   DISP: (90) tablet with 3 refills  Adjusted on 08/24/2020     o carvedilol 3.125 mg oral tablet   SIG: take 1 tablet (3.125 mg) by oral route 2 times per day with food for 90 days   DISP: (180) tablet with 3 refills  Adjusted on 08/24/2020     o Detrol LA 4 mg oral capsule,extended release 24hr   SIG: take 1 capsule (4 mg) by oral route once daily for 90 days   DISP: (90) capsules with 3 refills  Adjusted on 08/24/2020     o nifedipine 60 mg oral tablet extended release   SIG: take 1 tablet (60 mg) by oral route once daily for 90 days   DISP: (90) tablet with 3 refills  Adjusted on 08/24/2020     o Vitamin D3 50 mcg (2,000 unit) oral capsule   SIG: take 1 capsule by oral route daily for 90 days   DISP: (90) capsules with 3  refills  Adjusted on 08/24/2020     o Medications have been Reconciled  o Transition of Care or Provider Policy  · Instructions  o BRAT diet, Avoid dairy products.  o Patient was educated/instructed on their diagnosis, treatment and medications prior to discharge from the clinic today.  o Patient instructed to seek medical attention urgently for new or worsening symptoms.  o Call the office with any concerns or questions.  · Disposition  o Return to clinic in 3 months     Patient will return for a urinalysis since she is unable to urinate while in the office today.  Orders for stool specimens, patient given stool collection kit.    Recommended referral to ENT, patient declines.             Electronically Signed by: LATOSHA Patten -Author on August 24, 2020 08:49:45 PM

## 2021-05-13 NOTE — PROGRESS NOTES
Progress Note      Patient Name: Vanna Gil   Patient ID: [id number]   Sex: Female   YOB: 1955    Primary Care Provider: Ibeth REINA   Referring Provider: Ibeth REINA    Visit Date: July 13, 2020    Provider: LATOSHA Patten   Location: [facility]   Location Address: [address] [place] [address], [address]  [id number]   Location Phone: [phone]          Chief Complaint  · 1 week follow up      History Of Present Illness  Vanna Gil is a 65 year old /White female who presents for evaluation and treatment of:      1 week follow-up on dizziness and lab results.    She was started on loratadine 10 mg daily for allergies and fluid on her tympanic membranes.  She states her dizziness has improved, states she does still have some dizziness but not near as much.    Lab results reviewed:  Hemoglobin 11.1 hematocrit 34.5%, vitamin B12 502, folic acid 14.6, mildly elevated glucose 106, A1c within normal limits at 5.4%  She did have some bleeding after she cut her hand 2 weeks ago.    She is due for mammogram and she has never had a bone density scan.  History of vitamin D deficiency: She does take vitamin D3 2000 units and she takes calcium supplements.    Hypertension: Blood pressure is much improved today at 142/84, on carvedilol 3.125 mg twice daily and nifedipine 60 mg once daily.    Hyperlipidemia: Her cholesterol was at goal on atorvastatin 40 mg daily.    History depression/anxiety: She is stable on Zoloft 50 mg once daily and needs a refill.       Past Medical History  Disease Name Date Onset Notes   Anemia --  --    Anxiety --  --    Arthritis --  --    Asthma --  --    Depression --  --    Depression 02/14/2020 --    Essential hypertension 02/14/2020 --    Hearing loss --  --    Hemorrhoids --  --    Hernia --  --    Hyperlipidemia --  --    Hypertension --  --    OAB (overactive bladder) 02/14/2020 --    Reflux Disease --  --     Shortness of Breath --  --          Past Surgical History  Procedure Name Date Notes   Appendectomy --  --    Breast biopsy, left breast --  --    Colonoscopy 2018 9/20/2018- [facility] Repeat in 5 years   EGD 2018    Hernia Repair --  --    Knee Replacement --  --    MENISCUS TEAR --  --    Spinal Surgery --  --    Tonsilectomy --  --          Medication List  Name Date Started Instructions   atorvastatin 40 mg oral tablet 02/14/2020 take 1 tablet (40 mg) by oral route once daily at bedtime for 90 days   calcium carbonate 500 mg calcium (1,250 mg) oral tablet 02/14/2020 take 1 tablet by oral route 2 times a day for 90 days   carvedilol 3.125 mg oral tablet 02/14/2020 take 1 tablet (3.125 mg) by oral route 2 times per day with food for 90 days   Detrol LA 4 mg oral capsule,extended release 24hr 02/14/2020 take 1 capsule (4 mg) by oral route once daily for 90 days   hydroxyzine HCl 25 mg oral tablet 03/31/2020 take 1 tablet (25 mg) by oral route 2 times per day as needed for anxiety   loratadine 10 mg oral tablet 07/06/2020 take 1 tablet (10 mg) by oral route once daily for 90 days   nifedipine 60 mg oral tablet extended release 02/14/2020 take 1 tablet (60 mg) by oral route once daily for 90 days   Vitamin D3 2,000 unit oral capsule 02/14/2020 take 1 capsule by oral route daily for 90 days   Zoloft 50 mg oral tablet 07/13/2020 take 1 tablet (50 mg) by oral route once daily for 90 days         Allergy List  Allergen Name Date Reaction Notes   Aleve --  --  --    Levaquin --  --  --    Medrol --  --  --          Family Medical History  Disease Name Relative/Age Notes   - No Family History of Colorectal Cancer  --    Family history of breast cancer Mother/   --    Family history of heart disease Brother/  Father/   --          Social History  Finding Status Start/Stop Quantity Notes   Alcohol Current some day --/-- --  --    Sedentary --  --/-- --  --    Tobacco Former --/-- --  --          Immunizations  NameDate Admin [facility]  "Trade Name Lot Number Route Inj VIS Given VIS Publication   Xuhydlwbb23/23/2019 PMC Fluzone Quadrivalent [id number] IM LD 10/23/2019    Comments: Patient tolerated injection well, left in stable condition.   Myrfvsoev32/02/2018 PMC Fluzone Quadrivalent [id number] IM RD 11/02/2018 08/07/2015   Comments: Patient tolerated well. Left stable.   Prevnar 1309/07/2017 WAL PREVNAR 13 [id number] IM RD 09/07/2017 11/05/2015   Comments: Patient tolerated inj well and left the office in stable condition.         Review of Systems  · Constitutional  o Denies  o : fever, fatigue, weight loss, weight gain  · HENT  o Denies  o : lightheadedness, sore throat, ear pain, ear fullness  · Cardiovascular  o Denies  o : lower extremity edema, claudication, chest pressure, palpitations  · Respiratory  o Denies  o : shortness of breath, wheezing, cough, hemoptysis, dyspnea on exertion  · Gastrointestinal  o Denies  o : nausea, vomiting, diarrhea, constipation, abdominal pain  · Integument  o Denies  o : rash, itching  · Psychiatric  o Denies  o : anxiety, depression, suicidal ideation, homicidal ideation      Vitals  Date Time BP Position Site L\R Cuff Size HR RR TEMP (F) WT  HT  BMI kg/m2 BSA m2 O2 Sat        07/13/2020 11:12 /84 Sitting    73 - R  97.5 186lbs 6oz 5'  1\" 35.21 1.91 96 %          Physical Examination  · Constitutional  o Appearance  o : no acute distress, well-nourished  · Head and Face  o Head  o :   § Inspection  § : atraumatic, normocephalic  · Ears, Nose, Mouth and Throat  o Ears  o :   § External Ears  § : normal  § Otoscopic Examination  § : tympanic membrane dull in appearance-bilaterally   · Respiratory  o Respiratory Effort  o : breathing comfortably, symmetric chest rise  o Auscultation of Lungs  o : clear to asculatation bilaterally, no wheezes, rales, or rhonchii  · Cardiovascular  o Heart  o :   § Auscultation of Heart  § : regular rate and rhythm, no murmurs, rubs, or gallops  o Peripheral Vascular System  o : "   § Extremities  § : no edema  · Skin and Subcutaneous Tissue  o General Inspection  o : incision healing well left hand  · Neurologic  o Mental Status Examination  o :   § Orientation  § : grossly oriented to person, place and time  o Gait and Station  o :   § Gait Screening  § : normal gait  · Psychiatric  o General  o : normal mood and affect  o Presence of Abnormal Thoughts  o : no hallucinations, no delusions present, no psychotic thoughts, no homicidal ideation, no suicidal ideation, no evidence of obsessional thinking          Assessment  · Visit for screening mammogram     V76.12/Z12.31  · Anemia     285.9/D64.9  · Depression     311/F32.9  · Essential hypertension     401.9/I10  · Post-menopause     V49.81/Z78.0  · Vitamin D deficiency     268.9/E55.9    Problems Reconciled  Plan  · Orders  o Screening Mammography; Bilateral 3D (12479, , 01146) - V76.12/Z12.31 - 07/13/2020   Schedule with DEXA  o CBC with Auto Diff [facility] (52099) - 285.9/D64.9 - 07/13/2020  o Iron panel (iron, TIBC, transferrin saturation) (06675, 08717, 39171) - 285.9/D64.9 - 07/13/2020  o Ferritin ser/plas (13914) - 285.9/D64.9 - 07/13/2020  o DEXA Bone Density, 1 or more sites, axial skeleton [facility] (32045) - V49.81/Z78.0 - 07/13/2020   Schedule with mammo  o Vitamin D Level (62601) - V49.81/Z78.0, 268.9/E55.9 - 07/13/2020  o ACO - Pt declines to or was not able to provide an Advance Care Plan or name a Surrogate Decision Maker (1124F) - - 07/13/2020  o ACO-39: Current medications updated and reviewed () - - 07/13/2020  o ACO-13: Fall Risk Screening with no falls in past year or only one fall without injury in the past year (1101F) - - 07/13/2020   1 fall  · Medications  o Zoloft 50 mg oral tablet   SIG: take 1 tablet (50 mg) by oral route once daily for 90 days   DISP: (90) tablets with 3 refills  Adjusted on 07/13/2020     o Medications have been Reconciled  o Transition of Care or Provider Policy  · Instructions  o Patient advised  to monitor blood pressure (B/P) at home and journal readings. Patient informed that a B/P reading at home of more than 130/80 is considered hypertension. For readings greater mukw782/90 or higher patient is advised to follow up in the office with readings for management. Patient advised to limit sodium intake.  o Stop taking calcium supplements for at least 48 hours prior to your exam. Failure to stop supplements could alter results, and the radiologists will require you to reschedule your test.  o Patient was educated/instructed on their diagnosis, treatment and medications prior to discharge from the clinic today.  o Patient instructed to seek medical attention urgently for new or worsening symptoms.  o Call the office with any concerns or questions.  · Disposition  o Return to clinic in 3 months     We will check labs today to include iron profile and ferritin and repeat CBC.    Patient schedule annual Medicare wellness visit.             Electronically Signed by: LATOSHA Patten -Author on July 13, 2020 01:17:57 PM

## 2021-05-13 NOTE — PROGRESS NOTES
Progress Note      Patient Name: Vanna Gil   Patient ID: [id number]   Sex: Female   YOB: 1955    Primary Care Provider: Ibeth REINA   Referring Provider: Ibeth REINA    Visit Date: July 20, 2020    Provider: LATOSHA Patten   Location: [facility]   Location Address: [address] [place] [address], [address]  [id number]   Location Phone: [phone]          Chief Complaint  · Welcome to Medicare Visit      History Of Present Illness  The patient is a 65 year old /White female who has come to this office for her Welcome to Medicare Visit. Her Primary Care Provider is Ibeth REINA. Her comprehensive Care Team list, including suppliers, has been updated on the Facesheet. Her medical/surgical/family history, height, weight, BMI, and blood pressure have been reviewed and are in the chart.   Medications are listed in the medication list.   The active problem list includes: Anemia, Anxiety, Arthritis, Depression, Depression, Essential hypertension, Hearing loss, Hyperlipidemia, Hypertension, OAB (overactive bladder), and Reflux Disease   The patient does have a history of substance use. This includes alcohol use.   Patient reports her diet is not adequate. The patient's diet is lacking due to not having an appetite like she used to. Discussed daily nutritional guidelines, age appropriate.   Patient reports her physical activity is adequate.   A hearing loss screen was completed today and the result is positive, indicating a need for further evaluation.   Patient does not have any risk factors for depression. Patient completed the PHQ-9 today and it has been scanned in the chart. The total score is 1-4.   The Timed-Up-and-Go screen was administered today and the result is negative.   The Duncan Index of [place] in ADLs indicated moderate impairment (score of 3-5).   A Falls Risk Assessment has been completed, including a review of home  fall hazards and medication review.   Her level of safety is noted to be within normal limits. Her balance/gait is within normal limits. There have been two or more falls in the past year. Patient-specific home safety recommendations have been reviewed and a copy has been given to patient.   She admits issues with leaking urine. This happens frequently and she would not like to discuss this further today.   There are no additional risk factors identified.   Living Will/Advanced Directive has not previously been completed.   Personalized health advice was given to the patient and a written health screening schedule was established; see Plan for details.   Vanna Gil is a 65 year old /White female who presents for evaluation and treatment of:      History of hard of hearing, she is to have a hearing aid but states it broke due to the moisture in her ear.  She does not want to get another one at this time.    Former smoker, quit in 2004.    History of hypertension: Blood pressure stable at 128/64 on nifedipine 60 mg daily and carvedilol 3.125 mg twice daily.    History of overactive bladder: She is currently on Detrol LA 4 mg once daily.  She does admit she is still having incontinence but she denies this being bothersome at this time.    History of depression: She is currently stable on Zoloft 50 mg once daily and she has hydroxyzine.    She had had a cut on her left hand where she fell on some glass.  She states last week she was seen by LATOSHA Reyes and she had an I&D because she had developed an abscess.  Culture and sensitivity results were reviewed today which show positive for staph.  I will go ahead and start her on antibiotic as appropriate.    Obesity: Her BMI is 35.       Past Medical History  Disease Name Date Onset Notes   Anemia --  --    Anxiety --  --    Arthritis --  --    Asthma --  --    Depression --  --    Depression 02/14/2020 --    Essential hypertension 02/14/2020 --    Hearing  loss --  --    Hemorrhoids --  --    Hernia --  --    Hyperlipidemia --  --    Hypertension --  --    OAB (overactive bladder) 02/14/2020 --    Reflux Disease --  --    Shortness of Breath --  --          Past Surgical History  Procedure Name Date Notes   Appendectomy --  --    Breast biopsy, left breast --  --    Colonoscopy 2018 9/20/2018- [facility] Repeat in 5 years   EGD 2018    Hernia Repair --  --    Knee Replacement --  --    MENISCUS TEAR --  --    Spinal Surgery --  --    Tonsilectomy --  --          Medication List  Name Date Started Instructions   atorvastatin 40 mg oral tablet 02/14/2020 take 1 tablet (40 mg) by oral route once daily at bedtime for 90 days   calcium carbonate 500 mg calcium (1,250 mg) oral tablet 02/14/2020 take 1 tablet by oral route 2 times a day for 90 days   carvedilol 3.125 mg oral tablet 02/14/2020 take 1 tablet (3.125 mg) by oral route 2 times per day with food for 90 days   Detrol LA 4 mg oral capsule,extended release 24hr 02/14/2020 take 1 capsule (4 mg) by oral route once daily for 90 days   hydroxyzine HCl 25 mg oral tablet 03/31/2020 take 1 tablet (25 mg) by oral route 2 times per day as needed for anxiety   loratadine 10 mg oral tablet 07/06/2020 take 1 tablet (10 mg) by oral route once daily for 90 days   nifedipine 60 mg oral tablet extended release 02/14/2020 take 1 tablet (60 mg) by oral route once daily for 90 days   Vitamin D3 2,000 unit oral capsule 02/14/2020 take 1 capsule by oral route daily for 90 days   Zoloft 50 mg oral tablet 07/13/2020 take 1 tablet (50 mg) by oral route once daily for 90 days         Allergy List  Allergen Name Date Reaction Notes   Aleve --  --  --    Levaquin --  --  --    Medrol --  --  --          Family Medical History  Disease Name Relative/Age Notes   - No Family History of Colorectal Cancer  --    Family history of breast cancer Mother/   --    Family history of heart disease Brother/  Father/   --          Social History  Finding Status  "Start/Stop Quantity Notes   Alcohol Current some day --/-- --  --    Sedentary --  --/-- --  --    Tobacco Former --/-- --  --          Immunizations  NameDate Admin Mfg Trade Name Lot Number Route Inj VIS Given VIS Publication   Vpniwhjsa80/23/2019 PMC Fluzone Quadrivalent [id number] IM LD 10/23/2019    Comments: Patient tolerated injection well, left in stable condition.   Dafxtipwc56/02/2018 PMC Fluzone Quadrivalent [id number] IM RD 11/02/2018 08/07/2015   Comments: Patient tolerated well. Left stable.   Soqxdpnpn1586/20/2020 MSD PNEUMOVAX 23 [id number]  RD 07/20/2020    Comments: Patient tolerated injection well, left in stable condition.   Prevnar 1309/07/2017 WAL PREVNAR 13 [id number]  RD 09/07/2017 11/05/2015   Comments: Patient tolerated inj well and left the office in stable condition.         Review of Systems  · Constitutional  o Denies  o : fatigue, weight loss, weight gain  · Cardiovascular  o Denies  o : chest pain, irregular heart beats, palpitations  · Respiratory  o Denies  o : shortness of breath, wheezing, cough  · Gastrointestinal  o Denies  o : nausea, vomiting, diarrhea, constipation  · Genitourinary  o Admits  o : urgency, incontinence  o Denies  o : frequency, dysuria  · Integument  o Admits  o : Laceration on left hand with infection  o Denies  o : rash, itching  · Psychiatric  o Denies  o : anxiety, depression, suicidal ideation, homicidal ideation      Vitals  Date Time BP Position Site L\R Cuff Size HR RR TEMP (F) WT  HT  BMI kg/m2 BSA m2 O2 Sat        07/20/2020 11:14 /64 Sitting    76 - R  97.3 185lbs 8oz 5'  1\" 35.05 1.9 97 %          Physical Examination  · Constitutional  o Appearance  o : obese, well developed  · Head and Face  o Head  o :   § Inspection  § : atraumatic, normocephalic  · Respiratory  o Respiratory Effort  o : breathing comfortably, symmetric chest rise  · Cardiovascular  o Heart  o :   § Auscultation of Heart  § : regular rate, normal rhythm  o Peripheral Vascular " System  o :   § Extremities  § : no edema  · Skin and Subcutaneous Tissue  o General Inspection  o : Left hand laceration with dry peeling skin, no redness or drainage noted.  · Neurologic  o Mental Status Examination  o :   § Orientation  § : grossly oriented to person, place and time  o Gait and Station  o :   § Gait Screening  § : normal gait  · Psychiatric  o General  o : normal mood and affect          Assessment  · Welcome to Medicare preventive visit     V70.0/Z00.00  · Screening for depression     V79.0/Z13.89  · Screening for alcoholism     V79.1/Z13.39  · Need for pneumococcal vaccination     V03.82/Z23  · Essential hypertension     401.9/I10  · Class 2 obesity with body mass index (BMI) of 35.0 to 35.9 in adult, unspecified obesity type, unspecified whether serious comorbidity present       Obesity, unspecified     278.00/E66.9  Body mass index (BMI) 35.0-35.9, adult     278.00/Z68.35  · Former smoker, stopped smoking many years ago     V15.82/Z87.891  · Staph aureus infection     041.11/A49.01  · Laceration of left hand without foreign body, sequela     906.1/S61.412S  · OAB (overactive bladder)     596.51/N32.81  · Depression     296.31  · Hearing loss     389.9/H91.90      Plan  · Orders  o Falls Risk Assessment Completed (3288F) - V70.0/Z00.00 - 07/20/2020  o Brief hearing screening (written) [facility] () - V70.0/Z00.00 - 07/20/2020  o Welcome to Medicare Exam () - V70.0/Z00.00 - 07/20/2020  o EKG, Routine Screening (initial Medicare) tracing only () - V70.0/Z00.00 - 07/20/2020   No changes in EKG from previous EKG in 2017.   EKG performed in office by ELIEL BOX  o Presence/Absence of Urinary Incontinence Assessed [facility] (1090F) - V70.0/Z00.00 - 07/20/2020  o ACO-13: Fall Risk Screening with 2 or more falls in past year or any fall with injury in the past year (1100F) - V70.0/Z00.00 - 07/20/2020  o Annual depression screening using the PHQ-9 tool, 15 minutes (, 73489) - V79.0/Z13.89 -  07/20/2020  o ACO-18: Negative screen for clinical depression using a standardized tool () - V79.0/Z13.89 - 07/20/2020  o Annual alcohol screening using the AUDIT-C tool, 15 minutes [facility] ([address]90, ) - V79.1/Z13.39 - 07/20/2020  o Negative alcohol screening () - V79.1/Z13.39 - 07/20/2020  o Administration of Pneumococcal Vaccine - Medicare () - V03.82/Z23 - 07/20/2020  o ACO-39: Current medications updated and reviewed () - - 07/20/2020  o Immunization Admin Fee (Single) ([facility]) (31786) - - 07/20/2020  o ACO - Pt declines to or was not able to provide an Advance Care Plan or name a Surrogate Decision Maker (1124F) - - 07/20/2020  o ACO-18: Negative screen for clinical depression using a standardized tool () - 296.31 - 07/20/2020   negative 3 pts.  o ACO-13: Fall Risk Screening with 2 or more falls in past year or any fall with injury in the past year (1100F) - - 07/20/2020  o Xiozhtkzu76 Vaccine (53735) - V03.82/Z23 - 07/20/2020   Vaccine - Kzhjubvmh91; Dose: 0.5; Site: Right Deltoid; Route: Intramuscular; Date: 07/20/2020 12:47:00; Exp: 09/13/2021; Lot: [id number]; Mfg: [employer] TradeName: PNEUMOVAX 23; Administered By: Bobo Horvath MA; Comment: Patient tolerated injection well, left in stable condition.  · Medications  o doxycycline hyclate 100 mg oral capsule   SIG: take 1 capsule (100 mg) by oral route 2 times per day for 7 days   DISP: (14) capsules with 0 refills  Prescribed on 07/20/2020     o Medications have been Reconciled  o Transition of Care or Provider Policy  · Instructions  o Written health screening schedule for next 5-10 years was established with patient; information scanned in chart and given to patient.  o Fall prevention methods discussed and a copy of recommendations given to patient.  o Today's PHQ-9 score is: __3_  o Depression Screen completed and scanned into the EMR under the designated folder within the patient's documents.  o Audit-C Questionnaire  completed and scanned into the EMR under the designated folder within the patient's documents.  o Audit-C score of 0-4 - Negative Screen - Brief Discussion  o Patient was educated/instructed on their diagnosis, treatment and medications prior to discharge from the clinic today.  o Patient instructed to seek medical attention urgently for new or worsening symptoms.  o Call the office with any concerns or questions.  · Disposition  o Return to clinic in 4 weeks     We discussed her falls risk.  I advised her to make sure her walkways are clear, no clutter.  We discussed handrails in the bathroom and with any steps.    We will start her on doxycycline 100 mg twice daily for left hand staph infection.    Patient has a follow-up next month and we will discuss her BMI and obesity.    She is scheduled for her DEXA scan and mammogram on 9/24/2020.    Pneumococcal vaccine today.             Electronically Signed by: LATOSHA Patten -Author on July 20, 2020 01:01:50 PM

## 2021-05-13 NOTE — PROGRESS NOTES
Quick Note      Patient Name: Vanna Gil   Patient ID: [id number]   Sex: Female   YOB: 1955    Primary Care Provider: Ibeth REINA   Referring Provider: Ibeth REINA    Visit Date: May 15, 2020    Provider: LATOSHA Patten   Location: [facility]   Location Address: [address] [place] [address], [address]  [id number]   Location Phone: [phone]          History Of Present Illness  TELEHEALTH TELEPHONE VISIT  Chief Complaint: Follow-up on anxiety and depression.   Vanna Gil is a 65 year old /White female who is presenting for evaluation via telehealth telephone visit. Verbal consent obtained before beginning visit.   Provider spent 12 minutes with the patient during telehealth visit.   The following staff were present during this visit: Erika Lawrence CMA and LATOSHA Curry.   Past Medical History/Overview of Patient Symptoms     6-week follow-up on anxiety and depression.  We had increased her Zoloft to 50 mg last visit and added on hydroxyzine 25 mg as needed.  She states she is feeling much better on the 50 mg of Zoloft and she has not had to try the hydroxyzine yet.           Assessment  · Anxiety     300.02/F41.1  · Depression     296.31      Plan  · Orders  o Physician Telephone Evaluation, 11-20 minutes (30569) - 296.31, 300.02/F41.1 - 05/15/2020  · Medications  o Medications have been Reconciled  o Transition of Care or Provider Policy  · Instructions  o Plan Of Care:   o Patient instructed to seek medical attention urgently for new or worsening symptoms.  o Patient was educated/instructed on their diagnosis, treatment and medications.  o Call the office with any concerns or questions.  o Discussed Covid-19 precautions including, but not limited to, social distancing, avoid touching your face, and hand washing.   · Disposition  o Return to clinic in 3 months     Anxiety and depression much improved on Zoloft 50 mg daily.   Discussed with patient she can take hydroxyzine as needed for anxiety or to help her sleep at night.             Electronically Signed by: LATOSHA Patten -Author on May 15, 2020 11:41:51 AM

## 2021-05-13 NOTE — PROGRESS NOTES
Progress Note      Patient Name: Vanna Gil   Patient ID: [id number]   Sex: Female   YOB: 1955    Primary Care Provider: Ibeth REINA   Referring Provider: Ibeth REINA    Visit Date: July 6, 2020    Provider: LATOSHA Patten   Location: [facility]   Location Address: [address] [place] [address], [address] [place] [address]  [id number]   Location Phone: [phone]          Chief Complaint  · bruising easily  · light headed, feels like she is going to fall      History Of Present Illness  Vanna Gil is a 65 year old /White female who presents for evaluation and treatment of:      She is complaining of bruising easily in the past few weeks.  She does take aspirin 81 mg once daily and wants to know whether she should stop taking it.  She also complains of being lightheaded, feels like she is going to fall.    Hypertension: Her blood pressure is noted to be very high on arrival at 190/100.  Blood pressure was rechecked during visit and was found to be 154/86.  She is currently stable on carvedilol 3.125 mg twice daily and nifedipine 60 mg daily.    She states she did fall and her hand went through glass so she was sent to [facility] by ambulance.  She states she ended up having stitches.    History of hyperlipidemia: She is currently on atorvastatin 40 mg once daily.    History of depression and anxiety: She is currently stable on Zoloft 50 mg as listed.       Past Medical History  Disease Name Date Onset Notes   Anemia --  --    Anxiety --  --    Arthritis --  --    Asthma --  --    Depression --  --    Depression 02/14/2020 --    Essential hypertension 02/14/2020 --    Hearing loss --  --    Hemorrhoids --  --    Hernia --  --    Hyperlipidemia --  --    Hypertension --  --    OAB (overactive bladder) 02/14/2020 --    Reflux Disease --  --    Shortness of Breath --  --          Past Surgical History  Procedure Name Date Notes   Appendectomy --  --     Breast biopsy, left breast --  --    Colonoscopy 2018 9/20/2018- [facility] Repeat in 5 years   EGD 2018    Hernia Repair --  --    Knee Replacement --  --    MENISCUS TEAR --  --    Spinal Surgery --  --    Tonsilectomy --  --          Medication List  Name Date Started Instructions   atorvastatin 40 mg oral tablet 02/14/2020 take 1 tablet (40 mg) by oral route once daily at bedtime for 90 days   calcium carbonate 500 mg calcium (1,250 mg) oral tablet 02/14/2020 take 1 tablet by oral route 2 times a day for 90 days   carvedilol 3.125 mg oral tablet 02/14/2020 take 1 tablet (3.125 mg) by oral route 2 times per day with food for 90 days   Detrol LA 4 mg oral capsule,extended release 24hr 02/14/2020 take 1 capsule (4 mg) by oral route once daily for 90 days   hydroxyzine HCl 25 mg oral tablet 03/31/2020 take 1 tablet (25 mg) by oral route 2 times per day as needed for anxiety   nifedipine 60 mg oral tablet extended release 02/14/2020 take 1 tablet (60 mg) by oral route once daily for 90 days   Vitamin D3 2,000 unit oral capsule 02/14/2020 take 1 capsule by oral route daily for 90 days   Zoloft 50 mg oral tablet 03/31/2020 take 1 tablet (50 mg) by oral route once daily for 90 days         Allergy List  Allergen Name Date Reaction Notes   Aleve --  --  --    Levaquin --  --  --    Medrol --  --  --          Family Medical History  Disease Name Relative/Age Notes   - No Family History of Colorectal Cancer  --    Family history of breast cancer Mother/   --    Family history of heart disease Brother/  Father/   --          Social History  Finding Status Start/Stop Quantity Notes   Alcohol Current some day --/-- --  --    Sedentary --  --/-- --  --    Tobacco Former --/-- --  --          Immunizations  NameDate Admin Mfg Trade Name Lot Number Route Inj VIS Given VIS Publication   Xgkoboxjy66/23/2019 [facility] Fluzone Quadrivalent [id number] IM LD 10/23/2019    Comments: Patient tolerated injection well, left in stable condition.  "  Rdoaxdxni45/02/2018 [facility] Fluzone Quadrivalent [id number] IM RD 11/02/2018 08/07/2015   Comments: Patient tolerated well. Left stable.   Prevnar 1309/07/2017 [facility] PREVNAR 13 [id number] IM RD 09/07/2017 11/05/2015   Comments: Patient tolerated inj well and left the office in stable condition.         Review of Systems  · Constitutional  o Denies  o : fatigue, fever, chills, body aches, night sweats  · HENT  o Admits  o : lightheadedness, ear fullness  o Denies  o : headaches, sore throat, ear pain  · Cardiovascular  o Denies  o : lower extremity edema, claudication, chest pressure, palpitations  · Respiratory  o Denies  o : shortness of breath, wheezing, cough, hemoptysis, dyspnea on exertion  · Gastrointestinal  o Denies  o : nausea, vomiting, diarrhea, constipation, abdominal pain  · Integument  o Denies  o : rash, itching  · Neurologic  o Denies  o : altered mental status, muscular weakness  · Heme-Lymph  o Admits  o : lightheadedness, easy bleeding, easy bruising  o Denies  o : petechiae, lymph node enlargement or tenderness      Vitals  Date Time BP Position Site L\R Cuff Size HR RR TEMP (F) WT  HT  BMI kg/m2 BSA m2 O2 Sat HC       07/06/2020 01:05 /100 Sitting    70 - R  98.2 190lbs 8oz 5'  1\" 35.99 1.93 99 %    07/06/2020 01:26 /86 Sitting                     Physical Examination  · Constitutional  o Appearance  o : no acute distress, well-nourished  · Head and Face  o Head  o :   § Inspection  § : atraumatic, normocephalic  · Ears, Nose, Mouth and Throat  o Ears  o :   § External Ears  § : normal  § Otoscopic Examination  § : fluid bubbles present behind tympanic membrane-bilaterally   o Nose  o :   § Intranasal Exam  § : nares patent  o Oral Cavity  o :   § Oral Mucosa  § : moist mucous membranes  o Throat  o :   § Oropharynx  § : no inflammation or lesions present  · Neck  o Thyroid  o : gland size normal, nontender, no nodules or masses present on palpation, symmetric  · Respiratory  o Respiratory " Effort  o : breathing comfortably, symmetric chest rise  o Auscultation of Lungs  o : clear to asculatation bilaterally, no wheezes, rales, or rhonchii  · Cardiovascular  o Heart  o :   § Auscultation of Heart  § : regular rate and rhythm, no murmurs, rubs, or gallops  o Peripheral Vascular System  o :   § Extremities  § : no edema  · Lymphatic  o Neck  o : no lymphadenopathy present  · Skin and Subcutaneous Tissue  o General Inspection  o : Left lower extremity bruising noted  · Neurologic  o Mental Status Examination  o :   § Orientation  § : grossly oriented to person, place and time  o Gait and Station  o :   § Gait Screening  § : normal gait  · Psychiatric  o General  o : normal mood and affect          Assessment  · Essential hypertension     401.9/I10  · Hyperlipidemia     272.4/E78.5  · Anxiety     300.02/F41.1  · Depression     296.31  · Easy bruising     782.9/R23.8  · Dizziness     780.4/R42    Problems Reconciled  Plan  · Orders  o HTN/Lipid Panel (CMP, Lipid) [facility] (93957, 47134) - 401.9/I10, 782.9/R23.8, 780.4/R42 - 07/06/2020  o CBC with Auto Diff [facility] (15415) - 401.9/I10, 782.9/R23.8, 780.4/R42 - 07/06/2020  o Thyroid Profile (06692, 85692, THYII) - 401.9/I10, 782.9/R23.8, 780.4/R42 - 07/06/2020  o ACO-39: Current medications updated and reviewed () - - 07/06/2020  · Medications  o loratadine 10 mg oral tablet   SIG: take 1 tablet (10 mg) by oral route once daily for 90 days   DISP: (90) tablets with 1 refills  Prescribed on 07/06/2020     o aspirin 81 mg oral tablet,chewable   SIG: chew 1 tablet (81 mg) by oral route once daily for 90 days   DISP: (90) tablet with 3 refills  Discontinued on 07/06/2020     o Medications have been Reconciled  o Transition of Care or Provider Policy  · Instructions  o Patient advised to monitor blood pressure (B/P) at home and journal readings. Patient informed that a B/P reading at home of more than 130/80 is considered hypertension. For readings greater nuqb046/90  or higher patient is advised to follow up in the office with readings for management. Patient advised to limit sodium intake.  o Advised that cheeses and other sources of dairy fats, animal fats, fast food, and the extras (candy, pastries, pies, doughnuts and cookies) all contain LDL raising nutrients. Advised to increase fruits, vegetables, whole grains, and to monitor portion sizes.   o Patient was educated/instructed on their diagnosis, treatment and medications prior to discharge from the clinic today.  o Patient instructed to seek medical attention urgently for new or worsening symptoms.  o Call the office with any concerns or questions.  · Disposition  o Return to clinic in 1 week     We will start her on loratadine once daily due to fluid on tympanic membranes.  We discussed this might be causing her dizziness.    We will check labs today, will call with results.    We will request records from [facility].             Electronically Signed by: LATOSHA Patten -Author on July 6, 2020 02:11:37 PM

## 2021-05-13 NOTE — PROGRESS NOTES
"   Progress Note      Patient Name: Vanna Gil   Patient ID: [id number]   Sex: Female   YOB: 1955    Primary Care Provider: Ibeth REINA   Referring Provider: Les REINA    Visit Date: July 16, 2020    Provider: LATOSHA Reyes   Location: [facility]   Location Address: [address] [place] [address], [address] [place] [address]  [id number]   Location Phone: [phone]          Chief Complaint  · \"It looks like it is getting infected on my left hand.\"      History Of Present Illness  Vanna Gil is a 65 year old /White female who presents for evaluation and treatment of:      Is today for an acute visit for possible left hand infection.  She reports 4 weeks ago she had cut her hand on some glass at home.  She was taken via ambulance to the hospital and had her wound cleaned and sutured closed.  She was treated with antibiotics 4 weeks ago at the time she had cut her hand.  Yesterday she noticed a small bump with a yellow fluid underneath of the healing wound.  She reports tenderness surrounding the site.  Denies discharge.  There is some redness around the wound that is healing       Past Medical History  Disease Name Date Onset Notes   Anemia --  --    Anxiety --  --    Arthritis --  --    Asthma --  --    Depression --  --    Depression 02/14/2020 --    Essential hypertension 02/14/2020 --    Hearing loss --  --    Hemorrhoids --  --    Hernia --  --    Hyperlipidemia --  --    Hypertension --  --    OAB (overactive bladder) 02/14/2020 --    Reflux Disease --  --    Shortness of Breath --  --          Past Surgical History  Procedure Name Date Notes   Appendectomy --  --    Breast biopsy, left breast --  --    Colonoscopy 2018 9/20/2018- [facility] Repeat in 5 years   EGD 2018    Hernia Repair --  --    Knee Replacement --  --    MENISCUS TEAR --  --    Spinal Surgery --  --    Tonsilectomy --  --          Medication List  Name Date Started Instructions   atorvastatin 40 mg oral tablet " 02/14/2020 take 1 tablet (40 mg) by oral route once daily at bedtime for 90 days   calcium carbonate 500 mg calcium (1,250 mg) oral tablet 02/14/2020 take 1 tablet by oral route 2 times a day for 90 days   carvedilol 3.125 mg oral tablet 02/14/2020 take 1 tablet (3.125 mg) by oral route 2 times per day with food for 90 days   Detrol LA 4 mg oral capsule,extended release 24hr 02/14/2020 take 1 capsule (4 mg) by oral route once daily for 90 days   doxycycline hyclate 100 mg oral capsule 07/20/2020 take 1 capsule (100 mg) by oral route 2 times per day for 7 days   hydroxyzine HCl 25 mg oral tablet 03/31/2020 take 1 tablet (25 mg) by oral route 2 times per day as needed for anxiety   loratadine 10 mg oral tablet 07/06/2020 take 1 tablet (10 mg) by oral route once daily for 90 days   nifedipine 60 mg oral tablet extended release 02/14/2020 take 1 tablet (60 mg) by oral route once daily for 90 days   Vitamin D3 2,000 unit oral capsule 02/14/2020 take 1 capsule by oral route daily for 90 days   Zoloft 50 mg oral tablet 07/13/2020 take 1 tablet (50 mg) by oral route once daily for 90 days         Allergy List  Allergen Name Date Reaction Notes   Aleve --  --  --    Levaquin --  --  --    Medrol --  --  --          Family Medical History  Disease Name Relative/Age Notes   - No Family History of Colorectal Cancer  --    Family history of breast cancer Mother/   --    Family history of heart disease Brother/  Father/   --          Social History  Finding Status Start/Stop Quantity Notes   Alcohol Current some day --/-- --  --    Sedentary --  --/-- --  --    Tobacco Former --/-- --  --          Immunizations  NameDate Admin Mfg Trade Name Lot Number Route Inj VIS Given VIS Publication   Sdnvrdubt74/23/2019 [facility] Fluzone Quadrivalent [id number] IM LD 10/23/2019    Comments: Patient tolerated injection well, left in stable condition.   Ntfchblyu47/02/2018 [facility] Fluzone Quadrivalent [id number] IM RD 11/02/2018 08/07/2015   Comments:  "Patient tolerated well. Left stable.   Wdjskpxli9903/20/2020 [facility] PNEUMOVAX 23 [id number] IM RD 07/20/2020    Comments: Patient tolerated injection well, left in stable condition.   Prevnar 1309/07/2017 [facility] PREVNAR 13 [id number] IM RD 09/07/2017 11/05/2015   Comments: Patient tolerated inj well and left the office in stable condition.         Review of Systems  · Constitutional  o Denies  o : fatigue, fever, chills  · Integument  o Admits  o : Healing wound on left hand with possible infection  o Denies  o : rash      Vitals  Date Time BP Position Site L\R Cuff Size HR RR TEMP (F) WT  HT  BMI kg/m2 BSA m2 O2 Sat HC       07/06/2020 01:26 /86 Sitting               07/13/2020 11:12 /84 Sitting    73 - R  97.5 186lbs 6oz 5'  1\" 35.21 1.91 96 %    07/16/2020 12:47 /62 Sitting    70 - R  98.6 186lbs 6oz 5'  1\" 35.21 1.91 95 %          Physical Examination  · Constitutional  o Appearance  o : alert, in no acute distress  · Head and Face  o Head  o :   § Inspection  § : atraumatic, normocephalic  o Face  o :   § Inspection  § : no facial lesions  · Eyes  o Conjunctivae  o : conjunctivae normal  o Sclerae  o : sclerae white  o Pupils and Irises  o : pupils equal and round, pupils reactive to light bilaterally  o Eyelids/Ocular Adnexae  o : eyelid appearance normal  · Neck  o Inspection/Palpation  o : normal appearance, no masses or tenderness, trachea midline  o Thyroid  o : gland size normal, nontender, no nodules or masses present on palpation  · Respiratory  o Respiratory Effort  o : breathing unlabored  o Auscultation of Lungs  o : normal breath sounds  · Cardiovascular  o Heart  o :   § Auscultation of Heart  § : regular rate, normal rhythm, no murmurs present  o Peripheral Vascular System  o :   § Extremities  § : no edema  · Lymphatic  o Neck  o : no lymphadenopathy   · Skin and Subcutaneous Tissue  o General Inspection  o : Left hand on palm is a healing wound approximately 2 2-1/2 inches in length. In the " center is a 4 mm raised area with yellow fluid underneath.     Risk and benefits of I&D of abscess on the left palm discussed with patient prior to initiating the procedure.  She was given opportunity to ask questions.  Provided verbal consent to treatment.   Area was cleansed with alcohol.  Afterwards 22-gauge needle was inserted.  Scant amount of purulent drainage.  Culture obtained.  Was not able to identify if glass has remained in wound from her fall.  Area was cleansed antibiotic ointment applied with a Band-Aid applied over top. patient overall tolerated the procedure well was in no acute distress afterwards.  No immediate complications noted.           Assessment  · Cut of left hand, initial encounter     882.0/S61.412A  · Hand injury     959.4/S69.90XA  Send culture, continue antibiotic ointment and wound care. Follow-up next week with Ibeth REINA.    Problems Reconciled  Plan  · Orders  o ACO-39: Current medications updated and reviewed () - - 07/16/2020  o Wound Culture (76642) - 882.0/S61.412A, 959.4/S69.90XA - 07/16/2020  · Medications  o Medications have been Reconciled  o Transition of Care or Provider Policy  · Instructions  o Patient was educated/instructed on their diagnosis, treatment and medications prior to discharge from the clinic today.  o Patient instructed to seek medical attention urgently for new or worsening symptoms.  o Call the office with any concerns or questions.  · Disposition  o Call or Return if symptoms worsen or persist.  o f/u 1 week            Electronically Signed by: LATOSHA Reyes -Author on August 20, 2020 07:45:43 AM

## 2021-05-14 VITALS
HEIGHT: 61 IN | WEIGHT: 186.12 LBS | DIASTOLIC BLOOD PRESSURE: 72 MMHG | SYSTOLIC BLOOD PRESSURE: 116 MMHG | OXYGEN SATURATION: 95 % | TEMPERATURE: 97.7 F | BODY MASS INDEX: 35.14 KG/M2 | HEART RATE: 75 BPM

## 2021-05-14 VITALS — OXYGEN SATURATION: 96 % | BODY MASS INDEX: 34.74 KG/M2 | HEART RATE: 69 BPM | WEIGHT: 184 LBS | HEIGHT: 61 IN

## 2021-05-14 VITALS
HEART RATE: 71 BPM | OXYGEN SATURATION: 98 % | WEIGHT: 189.12 LBS | HEIGHT: 61 IN | SYSTOLIC BLOOD PRESSURE: 142 MMHG | TEMPERATURE: 98.5 F | DIASTOLIC BLOOD PRESSURE: 72 MMHG | BODY MASS INDEX: 35.7 KG/M2

## 2021-05-14 VITALS — OXYGEN SATURATION: 97 % | WEIGHT: 185 LBS | BODY MASS INDEX: 34.93 KG/M2 | HEART RATE: 69 BPM | HEIGHT: 61 IN

## 2021-05-14 VITALS — OXYGEN SATURATION: 97 % | BODY MASS INDEX: 34.93 KG/M2 | WEIGHT: 185 LBS | HEART RATE: 77 BPM | HEIGHT: 61 IN

## 2021-05-14 VITALS — BODY MASS INDEX: 34.74 KG/M2 | WEIGHT: 184 LBS | HEART RATE: 69 BPM | HEIGHT: 61 IN | OXYGEN SATURATION: 98 %

## 2021-05-14 NOTE — PROGRESS NOTES
Progress Note      Patient Name: Vanna Gil   Patient ID: [id number]   Sex: Female   YOB: 1955    Primary Care Provider: Ibeth REINA   Referring Provider: Ibeth REINA    Visit Date: February 25, 2021    Provider: Son Quiroz MD   Location: [facility]   Location Address: [address] [place] [address]  [id number]   Location Phone: [phone]          Chief Complaint  · Right Wrist Fracture      History Of Present Illness  Vanna Gil is a 65 year old /White female who presents today to [facility]. Patient is status-post right ORIF distal radius fracture performed on 12/18/20. Patient states recently hitting a door after a door swung open, resulting in pain along her 5th metacarpal. Patient is wearing a brace. Patient states performing home exercises.       Past Medical History  Anemia; Anemia, Unspecified; Anxiety; Arthritis; Asthma; Bladder Disorder; Depression; Depression; Essential hypertension; Hearing loss; Hemorrhoids; Hernia; Hyperlipemia; Hyperlipidemia; Hypertension; OAB (overactive bladder); Reflux; Reflux Disease; Shortness of Breath         Past Surgical History  Appendectomy; Artificial Joints/Limbs; Back; Breast biopsy, left breast; Colonoscopy; EGD; Hernia Repair; Knee Replacement; MENISCUS TEAR; Spinal Surgery; Tonsilectomy         Medication List  atorvastatin 40 mg oral tablet; calcium carbonate 500 mg calcium (1,250 mg) oral tablet; carvedilol 3.125 mg oral tablet; Detrol LA 4 mg oral capsule,extended release 24hr; hydroxyzine HCl 25 mg oral tablet; loratadine 10 mg oral tablet; Macrobid 100 mg oral capsule; nifedipine 60 mg oral tablet extended release; Vitamin D3 50 mcg (2,000 unit) oral capsule; Zoloft 50 mg oral tablet         Allergy List  Aleve; Levaquin; Medrol         Family Medical History  Heart Disease; Cancer, Unspecified; - No Family History of Colorectal Cancer; Family history of breast cancer;  "Family history of heart disease         Social History  Alcohol (Current some day); Alcohol Use (Current some day); lives alone; Recreational Drug Use (Never); Retired.; Sedentary; Tobacco (Former);          Review of Systems  · Constitutional  o Denies  o : fever, chills, weight loss  · Cardiovascular  o Denies  o : chest pain, shortness of breath  · Gastrointestinal  o Admits  o : heartburn  o Denies  o : liver disease, nausea, blood in stools  · Genitourinary  o Denies  o : painful urination, blood in urine  · Integument  o Denies  o : rash, itching  · Neurologic  o Denies  o : headache, weakness, loss of consciousness  · Musculoskeletal  o Denies  o : painful, swollen joints  · Psychiatric  o Admits  o : anxiety, depression  o Denies  o : drug/alcohol addiction      Vitals  Date Time BP Position Site L\R Cuff Size HR RR TEMP (F) WT  HT  BMI kg/m2 BSA m2 O2 Sat FR L/min FiO2        02/25/2021 03:28 PM      77 - R   184lbs 16oz 5'  1\" 34.96 1.9 97 %            Physical Examination  · Constitutional  o Appearance  o : well developed, well-nourished, no obvious deformities present  · Head and Face  o Head  o :   § Inspection  § : normocephalic  o Face  o :   § Inspection  § : no facial lesions  · Eyes  o Conjunctivae  o : conjunctivae normal  o Sclerae  o : sclerae white  · Ears, Nose, Mouth and Throat  o Ears  o :   § External Ears  § : appearance within normal limits  § Hearing  § : intact  o Nose  o :   § External Nose  § : appearance normal  · Neck  o Inspection/Palpation  o : normal appearance  o Range of Motion  o : full range of motion  · Respiratory  o Respiratory Effort  o : breathing unlabored  o Inspection of Chest  o : normal appearance  o Auscultation of Lungs  o : no audible wheezing or rales  · Cardiovascular  o Heart  o : regular rate  · Gastrointestinal  o Abdominal Examination  o : soft and non-tender  · Skin and Subcutaneous Tissue  o General Inspection  o : intact, no " rashes  · Psychiatric  o General  o : Alert and oriented x3  o Judgement and Insight  o : judgment and insight intact  o Mood and Affect  o : mood normal, affect appropriate  · Right Wrist  o Inspection  o : Patient is status-post right ORIF distal radius fracture performed on 12/18/20. Patient states recently hitting a door after a door swung open, resulting in pain along her 5th metacarpal. Patient is wearing a brace. Patient states performing home exercises.  · In Office Procedures  o View  o : AP/LATERAL  o Site  o : right, wrist   o Indication  o : Right wrist fracture  o Study  o : X-rays ordered, taken in the office, and reviewed today.  o Xray  o : X-rays show good alignment and fixation of distal radius fracture.   o Comparative Data  o : No comparative data found          Assessment  · Right distal radius fracture, ORIF-Aftercare following surgery of the muskuloskeletal system     V54.81  · Right wrist pain     719.43/M25.531      Plan  · Orders  o Wrist (Right) 2 views X-Ray [facility] ([id number]) - 719.43/M25.531 - 02/25/2021  · Medications  o Medications have been Reconciled  o Transition of Care or Provider Policy  · Instructions  o Reviewed the patient's Past Medical, Social, and Family history as well as the ROS at today's visit, no changes.  o Call or return if worsening symptoms.  o This note is transcribed by Kita montgomery/keila  o Hand therapy. Follow-up as needed.             Electronically Signed by: Kita Londono, -Author on February 26, 2021 09:42:29 AM  Electronically Co-signed by: Ibeth Hodgson PA-C -Reviewer on February 26, 2021 09:55:23 AM  Electronically Co-signed by: Son Quiroz MD -Reviewer on February 26, 2021 03:12:04 PM

## 2021-05-14 NOTE — PROGRESS NOTES
Progress Note      Patient Name: Vanna Gil   Patient ID: [id number]   Sex: Female   YOB: 1955    Primary Care Provider: Ibeth REINA   Referring Provider: Ibeth REINA    Visit Date: January 26, 2021    Provider: Stephanie Leonardo PA-C   Location: [facility]   Location Address: [address] [place] [address]  [id number]   Location Phone: [phone]          Chief Complaint  · Follow up right wrist pain      History Of Present Illness  Vanna Gil is a 65 year old /White female who presents today to [facility].      She is s/p ORIF right 4-part distal radius fracture 12/18/20 by Dr. Quiroz. Pain is well controlled. She has been in short arm cast.       Past Medical History  Anemia; Anemia, Unspecified; Anxiety; Arthritis; Asthma; Bladder Disorder; Depression; Depression; Essential hypertension; Hearing loss; Hemorrhoids; Hernia; Hyperlipemia; Hyperlipidemia; Hypertension; OAB (overactive bladder); Reflux; Reflux Disease; Shortness of Breath         Past Surgical History  Appendectomy; Artificial Joints/Limbs; Back; Breast biopsy, left breast; Colonoscopy; EGD; Hernia Repair; Knee Replacement; MENISCUS TEAR; Spinal Surgery; Tonsilectomy         Medication List  atorvastatin 40 mg oral tablet; calcium carbonate 500 mg calcium (1,250 mg) oral tablet; carvedilol 3.125 mg oral tablet; Detrol LA 4 mg oral capsule,extended release 24hr; hydroxyzine HCl 25 mg oral tablet; loratadine 10 mg oral tablet; Macrobid 100 mg oral capsule; nifedipine 60 mg oral tablet extended release; Vitamin D3 50 mcg (2,000 unit) oral capsule; Zoloft 50 mg oral tablet         Allergy List  Aleve; Levaquin; Medrol         Family Medical History  Heart Disease; Cancer, Unspecified; - No Family History of Colorectal Cancer; Family history of breast cancer; Family history of heart disease         Social History  Alcohol (Current some day); Alcohol Use (Current some day);  "lives alone; Recreational Drug Use (Never); Retired.; Sedentary; Tobacco (Former);          Review of Systems  · Constitutional  o Denies  o : fever, chills, weight loss  · Cardiovascular  o Denies  o : chest pain, shortness of breath  · Gastrointestinal  o Denies  o : liver disease, heartburn, nausea, blood in stools  · Genitourinary  o Denies  o : painful urination, blood in urine  · Integument  o Denies  o : rash, itching  · Neurologic  o Denies  o : headache, weakness, loss of consciousness  · Musculoskeletal  o Admits  o : painful, swollen joints  · Psychiatric  o Denies  o : drug/alcohol addiction, anxiety, depression      Vitals  Date Time BP Position Site L\R Cuff Size HR RR TEMP (F) WT  HT  BMI kg/m2 BSA m2 O2 Sat FR L/min FiO2 HC       01/26/2021 03:37 PM      69 - R   184lbs 16oz 5'  1\" 34.96 1.9 97 %            Physical Examination  · Constitutional  o Appearance  o : well developed, well-nourished, no obvious deformities present  · Head and Face  o Head  o :   § Inspection  § : normocephalic  o Face  o :   § Inspection  § : no facial lesions  · Eyes  o Conjunctivae  o : conjunctivae normal  o Sclerae  o : sclerae white  · Ears, Nose, Mouth and Throat  o Ears  o :   § External Ears  § : appearance within normal limits  § Hearing  § : intact  o Nose  o :   § External Nose  § : appearance normal  · Neck  o Inspection/Palpation  o : normal appearance  o Range of Motion  o : full range of motion  · Respiratory  o Respiratory Effort  o : breathing unlabored  o Inspection of Chest  o : normal appearance  o Auscultation of Lungs  o : no audible wheezing or rales  · Cardiovascular  o Heart  o : regular rate  · Gastrointestinal  o Abdominal Examination  o : soft and non-tender  · Skin and Subcutaneous Tissue  o General Inspection  o : intact, no rashes  · Psychiatric  o General  o : Alert and oriented x3  o Judgement and Insight  o : judgment and insight intact  o Mood and Affect  o : mood normal, affect " appropriate  · Right Wrist  o Inspection  o : Cast removed, skin intact. Incision well healed. ROM is stiff. Sensation grossly intact. All compartments soft and well perfused. Full ROM of digits.   · In Office Procedures  o View  o : AP/LATERAL  o Site  o : right, wrist   o Indication  o : Right arm pain   o Study  o : X-rays ordered, taken in the office, and reviewed today.  o Xray  o : Well aligned right distal radius fracture. Hardware intact.   o Comparative Data  o : Comparative Data found and reviewed today           Assessment  · Aftercare following surgery of the muskuloskeletal system     V54.81  · Pain: Wrist     719.43/M25.539      Plan  · Orders  o Wrist (Right) 2 views X-Ray [facility] (04232-CK) - 719.43/M25.539 - 01/26/2021  · Medications  o Medications have been Reconciled  o Transition of Care or Provider Policy  · Instructions  o Reviewed the patient's Past Medical, Social, and Family history as well as the ROS at today's visit, no changes.  o Call or return if worsening symptoms.  o Brace provided. May removed for ROM exercises, bathing, icing. Follow up 3-4 weeks, repeat x-rays. No heavy lifting.   o Electronically Identified Patient Education Materials Provided Electronically            Electronically Signed by: SUSAN EmersonC -Author on January 26, 2021 04:05:01 PM

## 2021-05-14 NOTE — PROGRESS NOTES
Progress Note      Patient Name: Vanna Gil   Patient ID: [id number]   Sex: Female   YOB: 1955    Primary Care Provider: Ibeth REINA   Referring Provider: Ibeth REINA    Visit Date: March 9, 2021    Provider: LATOSHA Patten   Location: [facility]   Location Address: [address] [place] [address], [address]  [id number]   Location Phone: [phone]          Chief Complaint  · follow up      History Of Present Illness  Vanna Gil is a 65 year old /White female who presents for evaluation and treatment of:      She is here for 6-month follow-up.    She states she fell in December and fractured her right wrist.  She has been following with orthopedic Dr. Quiroz.  She is currently wearing a wrist brace.  She states she is currently doing physical therapy.    Hypertension: Blood pressure stable 142/72 on nifedipine 60 mg and carvedilol 3.125 mg twice daily.    Hyperlipidemia: She is currently on atorvastatin 40 mg once daily.    History of anemia: She is not currently on any iron supplements.  She does complain of fatigue.    History of overactive bladder: She is stable on Detrol.    History of depression: She is currently stable on Zoloft 50 mg daily.       Past Medical History  Disease Name Date Onset Notes   Anemia --  --    Anemia, Unspecified --  --    Anxiety --  --    Arthritis --  --    Asthma --  --    Bladder disorder --  --    Depression --  --    Depression 02/14/2020 --    Essential hypertension 02/14/2020 --    Hearing loss --  --    Hemorrhoids --  --    Hernia --  --    Hyperlipemia --  --    Hyperlipidemia --  --    Hypertension --  --    OAB (overactive bladder) 02/14/2020 --    Reflux --  --    Reflux Disease --  --    Shortness of Breath --  --          Past Surgical History  Procedure Name Date Notes   Appendectomy --  --    Artificial Joints/Limbs --  --    Back --  --    Breast biopsy, left breast --  --     Colonoscopy --  9/20/2018- [facility] 5 years   EGD 2018    Hernia Repair --  --    Knee Replacement --  --    MENISCUS TEAR --  --    Spinal Surgery --  --    Tonsilectomy --  --          Medication List  Name Date Started Instructions   atorvastatin 40 mg oral tablet 08/24/2020 take 1 tablet (40 mg) by oral route once daily at bedtime for 90 days   calcium carbonate 500 mg calcium (1,250 mg) oral tablet 03/04/2021 take 1 tablet by oral route 2 times a day for 90 days   carvedilol 3.125 mg oral tablet 08/24/2020 take 1 tablet (3.125 mg) by oral route 2 times per day with food for 90 days   Detrol LA 4 mg oral capsule,extended release 24hr 08/24/2020 take 1 capsule (4 mg) by oral route once daily for 90 days   hydroxyzine HCl 25 mg oral tablet 03/31/2020 take 1 tablet (25 mg) by oral route 2 times per day as needed for anxiety   loratadine 10 mg oral tablet 01/06/2021 take 1 tablet (10 mg) by oral route once daily for 90 days   nifedipine 60 mg oral tablet extended release 08/24/2020 take 1 tablet (60 mg) by oral route once daily for 90 days   Vitamin D3 50 mcg (2,000 unit) oral capsule 08/24/2020 take 1 capsule by oral route daily for 90 days   Zoloft 50 mg oral tablet 07/13/2020 take 1 tablet (50 mg) by oral route once daily for 90 days         Allergy List  Allergen Name Date Reaction Notes   Aleve --  --  --    Levaquin --  --  --    Medrol --  --  --          Family Medical History  Disease Name Relative/Age Notes   Heart Disease Brother/  Father/   Father; Brother   Cancer, Unspecified Mother/   Mother   - No Family History of Colorectal Cancer  --    Family history of breast cancer Mother/   --    Family history of heart disease Brother/  Father/   --          Social History  Finding Status Start/Stop Quantity Notes   Alcohol Current some day --/-- --  --    Alcohol Use Current some day --/-- --  occasionally drinks, less than 1 drink per day   lives alone --  --/-- --  --    Recreational Drug Use Never  "--/-- --  no   Retired. --  --/-- --  --    Sedentary --  --/-- --  --    Tobacco Former --/-- --  former smoker    --  --/-- --  --          Immunizations  NameDate Admin Mfg Trade Name Lot Number Route Inj VIS Given VIS Publication   Btpqhktpr22/02/2020 PMC FLUZONE-HIGH DOSE [id number] IM LD 11/02/2020    Comments: TOLERATD WELL.LEFT TABLE.   Ryluxchgj4922/20/2020 MSD PNEUMOVAX 23 [id number] IM RD 07/20/2020    Comments: Patient tolerated injection well, left in stable condition.   Prevnar 1309/07/2017 WAL PREVNAR 13 [id number] IM RD 09/07/2017 11/05/2015   Comments: Patient tolerated inj well and left the office in stable condition.         Review of Systems  · Constitutional  o Admits  o : fatigue  o Denies  o : fever, weight loss, weight gain  · Cardiovascular  o Denies  o : lower extremity edema, claudication, chest pressure, palpitations  · Respiratory  o Denies  o : shortness of breath, wheezing, cough, hemoptysis, dyspnea on exertion  · Gastrointestinal  o Denies  o : nausea, vomiting, diarrhea, constipation, abdominal pain  · Genitourinary  o Denies  o : urgency, frequency  · Integument  o Denies  o : rash, itching  · Musculoskeletal  o Admits  o : wrist pain  o Denies  o : joint swelling      Vitals  Date Time BP Position Site L\R Cuff Size HR RR TEMP (F) WT  HT  BMI kg/m2 BSA m2 O2 Sat FR L/min FiO2 HC       03/09/2021 03:03 /72 Sitting    71 - R  98.5 189lbs 2oz 5'  1\" 35.73 1.92 98 %            Physical Examination  · Constitutional  o Appearance  o : no acute distress, well-nourished  · Head and Face  o Head  o :   § Inspection  § : atraumatic, normocephalic  · Neck  o Thyroid  o : gland size normal, nontender, no nodules or masses present on palpation, symmetric  · Respiratory  o Respiratory Effort  o : breathing comfortably, symmetric chest rise  o Auscultation of Lungs  o : clear to asculatation bilaterally, no wheezes, rales, or rhonchii  · Cardiovascular  o Heart  o :   § Auscultation of " Heart  § : regular rate and rhythm, no murmurs, rubs, or gallops  o Peripheral Vascular System  o :   § Extremities  § : no edema  · Neurologic  o Mental Status Examination  o :   § Orientation  § : grossly oriented to person, place and time  o Gait and Station  o :   § Gait Screening  § : normal gait  · Psychiatric  o General  o : normal mood and affect          Assessment  · Anemia     285.9/D64.9  She is having fatigue, we will check CBC, iron profile, ferritin and vitamin B12 and folate.  · Essential hypertension     401.9/I10  Blood pressure stable on meds as listed.  · Hyperlipidemia     272.4/E78.5  · Depression     296.31  Stable on Zoloft      Plan  · Orders  o B12 Folate levels (B12FO) - 285.9/D64.9 - 03/09/2021  o CBC with Auto Diff [facility] (70677) - 285.9/D64.9 - 03/09/2021  o Iron panel (iron, TIBC, transferrin saturation) (52577, 29042, 73710) - 285.9/D64.9 - 03/09/2021  o Ferritin ser/plas (66423) - 285.9/D64.9 - 03/09/2021  o HTN/Lipid Panel (CMP, Lipid) [facility] (59839, 88707) - 401.9/I10, 272.4/E78.5 - 03/09/2021  o ACO-39: Current medications updated and reviewed (, 1159F) - - 03/09/2021  · Instructions  o Patient was educated/instructed on their diagnosis, treatment and medications prior to discharge from the clinic today.  o Patient instructed to seek medical attention urgently for new or worsening symptoms.  o Call the office with any concerns or questions.  · Disposition  o Return to clinic in 6 months            Electronically Signed by: LATOSHA Patten -Author on March 9, 2021 08:18:06 PM

## 2021-05-15 VITALS
TEMPERATURE: 97.5 F | BODY MASS INDEX: 35.33 KG/M2 | DIASTOLIC BLOOD PRESSURE: 92 MMHG | OXYGEN SATURATION: 96 % | HEIGHT: 61 IN | WEIGHT: 187.12 LBS | HEART RATE: 76 BPM | SYSTOLIC BLOOD PRESSURE: 132 MMHG

## 2021-05-15 VITALS
WEIGHT: 193.25 LBS | HEART RATE: 88 BPM | DIASTOLIC BLOOD PRESSURE: 66 MMHG | OXYGEN SATURATION: 97 % | SYSTOLIC BLOOD PRESSURE: 122 MMHG | BODY MASS INDEX: 36.49 KG/M2 | HEIGHT: 61 IN

## 2021-05-15 VITALS
TEMPERATURE: 97.5 F | DIASTOLIC BLOOD PRESSURE: 84 MMHG | OXYGEN SATURATION: 96 % | HEIGHT: 61 IN | HEART RATE: 73 BPM | WEIGHT: 186.37 LBS | BODY MASS INDEX: 35.19 KG/M2 | SYSTOLIC BLOOD PRESSURE: 142 MMHG

## 2021-05-15 VITALS
TEMPERATURE: 97.8 F | HEART RATE: 70 BPM | SYSTOLIC BLOOD PRESSURE: 120 MMHG | OXYGEN SATURATION: 96 % | DIASTOLIC BLOOD PRESSURE: 74 MMHG | HEIGHT: 61 IN | BODY MASS INDEX: 37.38 KG/M2 | WEIGHT: 198 LBS

## 2021-05-15 VITALS
OXYGEN SATURATION: 97 % | SYSTOLIC BLOOD PRESSURE: 112 MMHG | DIASTOLIC BLOOD PRESSURE: 68 MMHG | BODY MASS INDEX: 35 KG/M2 | HEIGHT: 61 IN | TEMPERATURE: 98 F | HEART RATE: 66 BPM | WEIGHT: 185.37 LBS

## 2021-05-15 VITALS
SYSTOLIC BLOOD PRESSURE: 122 MMHG | BODY MASS INDEX: 38.04 KG/M2 | WEIGHT: 201.5 LBS | DIASTOLIC BLOOD PRESSURE: 64 MMHG | TEMPERATURE: 98.1 F | OXYGEN SATURATION: 94 % | HEIGHT: 61 IN | HEART RATE: 67 BPM

## 2021-05-15 VITALS
BODY MASS INDEX: 35.02 KG/M2 | DIASTOLIC BLOOD PRESSURE: 64 MMHG | SYSTOLIC BLOOD PRESSURE: 128 MMHG | HEIGHT: 61 IN | TEMPERATURE: 97.3 F | OXYGEN SATURATION: 97 % | HEART RATE: 76 BPM | WEIGHT: 185.5 LBS

## 2021-05-15 VITALS
HEART RATE: 77 BPM | BODY MASS INDEX: 34.83 KG/M2 | RESPIRATION RATE: 18 BRPM | WEIGHT: 184.5 LBS | TEMPERATURE: 98 F | SYSTOLIC BLOOD PRESSURE: 132 MMHG | OXYGEN SATURATION: 98 % | DIASTOLIC BLOOD PRESSURE: 70 MMHG | HEIGHT: 61 IN

## 2021-05-15 VITALS
DIASTOLIC BLOOD PRESSURE: 73 MMHG | OXYGEN SATURATION: 99 % | SYSTOLIC BLOOD PRESSURE: 151 MMHG | RESPIRATION RATE: 12 BRPM | HEIGHT: 61 IN | HEART RATE: 67 BPM | WEIGHT: 200 LBS | BODY MASS INDEX: 37.76 KG/M2

## 2021-05-15 VITALS
OXYGEN SATURATION: 99 % | WEIGHT: 190.5 LBS | HEART RATE: 70 BPM | HEIGHT: 61 IN | DIASTOLIC BLOOD PRESSURE: 100 MMHG | SYSTOLIC BLOOD PRESSURE: 190 MMHG | TEMPERATURE: 98.2 F | BODY MASS INDEX: 35.97 KG/M2

## 2021-05-15 VITALS
DIASTOLIC BLOOD PRESSURE: 76 MMHG | WEIGHT: 199 LBS | TEMPERATURE: 98.2 F | HEART RATE: 70 BPM | BODY MASS INDEX: 37.57 KG/M2 | OXYGEN SATURATION: 97 % | HEIGHT: 61 IN | SYSTOLIC BLOOD PRESSURE: 140 MMHG

## 2021-05-15 VITALS
TEMPERATURE: 98.6 F | WEIGHT: 186.37 LBS | DIASTOLIC BLOOD PRESSURE: 62 MMHG | HEIGHT: 61 IN | BODY MASS INDEX: 35.19 KG/M2 | HEART RATE: 70 BPM | SYSTOLIC BLOOD PRESSURE: 124 MMHG | OXYGEN SATURATION: 95 %

## 2021-05-15 VITALS
BODY MASS INDEX: 37.81 KG/M2 | TEMPERATURE: 98.5 F | HEIGHT: 61 IN | DIASTOLIC BLOOD PRESSURE: 62 MMHG | HEART RATE: 85 BPM | OXYGEN SATURATION: 90 % | WEIGHT: 200.25 LBS | SYSTOLIC BLOOD PRESSURE: 124 MMHG

## 2021-05-16 VITALS
DIASTOLIC BLOOD PRESSURE: 78 MMHG | BODY MASS INDEX: 37.19 KG/M2 | HEART RATE: 66 BPM | SYSTOLIC BLOOD PRESSURE: 126 MMHG | HEIGHT: 61 IN | OXYGEN SATURATION: 97 % | WEIGHT: 197 LBS | TEMPERATURE: 98.3 F

## 2021-05-16 VITALS
OXYGEN SATURATION: 99 % | HEIGHT: 61 IN | WEIGHT: 197 LBS | DIASTOLIC BLOOD PRESSURE: 72 MMHG | SYSTOLIC BLOOD PRESSURE: 141 MMHG | HEART RATE: 66 BPM | BODY MASS INDEX: 37.19 KG/M2

## 2021-05-16 VITALS
DIASTOLIC BLOOD PRESSURE: 77 MMHG | HEART RATE: 72 BPM | SYSTOLIC BLOOD PRESSURE: 145 MMHG | BODY MASS INDEX: 37.19 KG/M2 | WEIGHT: 197 LBS | OXYGEN SATURATION: 100 % | HEIGHT: 61 IN

## 2021-05-16 VITALS
OXYGEN SATURATION: 95 % | TEMPERATURE: 98.1 F | HEIGHT: 61 IN | HEART RATE: 68 BPM | DIASTOLIC BLOOD PRESSURE: 88 MMHG | WEIGHT: 205.37 LBS | BODY MASS INDEX: 38.77 KG/M2 | SYSTOLIC BLOOD PRESSURE: 128 MMHG

## 2021-06-04 ENCOUNTER — HOSPITAL ENCOUNTER (EMERGENCY)
Dept: EMERGENCY DEPT | Facility: HOSPITAL | Age: 66
Discharge: HOME | End: 2021-06-05
Admitting: EMERGENCY MEDICINE

## 2021-06-04 LAB
BASOPHILS # BLD AUTO: 0.08 10*3/UL (ref 0–0.2)
BASOPHILS NFR BLD AUTO: 0.5 % (ref 0–3)
CONV ABS IMM GRAN: 0.06 10*3/UL (ref 0–0.2)
CONV IMMATURE GRAN: 0.4 % (ref 0–1.8)
DEPRECATED RDW RBC AUTO: 43.4 FL (ref 36.4–46.3)
EOSINOPHIL # BLD AUTO: 0.11 10*3/UL (ref 0–0.7)
EOSINOPHIL # BLD AUTO: 0.7 % (ref 0–7)
ERYTHROCYTE [DISTWIDTH] IN BLOOD BY AUTOMATED COUNT: 13.4 % (ref 11.7–14.4)
HCT VFR BLD AUTO: 40.9 % (ref 37–47)
HGB BLD-MCNC: 14.1 G/DL (ref 12–16)
LYMPHOCYTES # BLD AUTO: 1.26 10*3/UL (ref 1–5)
LYMPHOCYTES NFR BLD AUTO: 8.2 % (ref 20–45)
MCH RBC QN AUTO: 30.4 PG (ref 27–31)
MCHC RBC AUTO-ENTMCNC: 34.5 G/DL (ref 33–37)
MCV RBC AUTO: 88.1 FL (ref 81–99)
MONOCYTES # BLD AUTO: 0.88 10*3/UL (ref 0.2–1.2)
MONOCYTES NFR BLD AUTO: 5.7 % (ref 3–10)
NEUTROPHILS # BLD AUTO: 13.03 10*3/UL (ref 2–8)
NEUTROPHILS NFR BLD AUTO: 84.5 % (ref 30–85)
NRBC CBCN: 0 % (ref 0–0.7)
PLATELET # BLD AUTO: 265 10*3/UL (ref 130–400)
PMV BLD AUTO: 10.5 FL (ref 9.4–12.3)
RBC # BLD AUTO: 4.64 10*6/UL (ref 4.2–5.4)
WBC # BLD AUTO: 15.42 10*3/UL (ref 4.8–10.8)

## 2021-06-04 PROCEDURE — 36415 COLL VENOUS BLD VENIPUNCTURE: CPT

## 2021-06-04 PROCEDURE — 9990 LIPASE

## 2021-06-04 PROCEDURE — 9990

## 2021-06-04 PROCEDURE — 9990 VENIPUNCTURE

## 2021-06-04 PROCEDURE — 9990 COMPREHENSIVE METABOLIC PANEL

## 2021-06-04 PROCEDURE — 85025 COMPLETE CBC W/AUTO DIFF WBC: CPT

## 2021-06-04 PROCEDURE — 83690 ASSAY OF LIPASE: CPT

## 2021-06-04 PROCEDURE — 80053 COMPREHEN METABOLIC PANEL: CPT

## 2021-06-04 PROCEDURE — 99202 OFFICE O/P NEW SF 15 MIN: CPT

## 2021-06-05 ENCOUNTER — HOSPITAL ENCOUNTER (OUTPATIENT)
Dept: CT IMAGING | Facility: HOSPITAL | Age: 66
Discharge: HOME | End: 2021-06-05
Admitting: EMERGENCY MEDICINE

## 2021-06-05 DIAGNOSIS — R10.33 PERIUMBILICAL ABDOMINAL PAIN: ICD-10-CM

## 2021-06-05 LAB
ALBUMIN SERPL-MCNC: 4.3 G/DL (ref 3.5–5)
ALBUMIN/GLOB SERPL: 1.2 {RATIO} (ref 1.4–2.6)
ALP SERPL-CCNC: 112 U/L (ref 43–160)
ALT SERPL-CCNC: 17 U/L (ref 10–40)
ANION GAP SERPL CALC-SCNC: 17 MMOL/L (ref 8–19)
AST SERPL-CCNC: 25 U/L (ref 15–50)
BILIRUB SERPL-MCNC: 0.41 MG/DL (ref 0.2–1.3)
BUN SERPL-MCNC: 24 MG/DL (ref 5–25)
BUN/CREAT SERPL: 27 {RATIO} (ref 6–20)
CALCIUM SERPL-MCNC: 9.9 MG/DL (ref 8.7–10.4)
CHLORIDE SERPL-SCNC: 100 MMOL/L (ref 99–111)
CONV CO2: 25 MMOL/L (ref 22–32)
CONV TOTAL PROTEIN: 7.9 G/DL (ref 6.3–8.2)
CREAT UR-MCNC: 0.89 MG/DL (ref 0.5–0.9)
GFR SERPLBLD BASED ON 1.73 SQ M-ARVRAT: >60 ML/MIN/{1.73_M2}
GLOBULIN UR ELPH-MCNC: 3.6 G/DL (ref 2–3.5)
GLUCOSE SERPL-MCNC: 208 MG/DL (ref 65–99)
LIPASE SERPL-CCNC: 17 U/L (ref 5–51)
OSMOLALITY SERPL CALC.SUM OF ELEC: 296 MOSM/KG (ref 273–304)
POTASSIUM SERPL-SCNC: 4.2 MMOL/L (ref 3.5–5.3)
SODIUM SERPL-SCNC: 138 MMOL/L (ref 135–147)

## 2021-06-05 PROCEDURE — 74177 CT ABD & PELVIS W/CONTRAST: CPT

## 2021-06-05 PROCEDURE — 0 IOPAMIDOL PER 1 ML: Performed by: EMERGENCY MEDICINE

## 2021-06-05 RX ORDER — SODIUM CHLORIDE 9 MG/ML
INJECTION, SOLUTION INTRAVENOUS
Status: DISCONTINUED
Start: 2021-06-05 | End: 2021-06-05 | Stop reason: HOSPADM

## 2021-06-05 RX ORDER — DICYCLOMINE HYDROCHLORIDE 10 MG/1
CAPSULE ORAL
Status: DISCONTINUED
Start: 2021-06-05 | End: 2021-06-05 | Stop reason: HOSPADM

## 2021-06-05 RX ADMIN — IOPAMIDOL 100 ML: 755 INJECTION, SOLUTION INTRAVENOUS at 23:14

## 2021-06-07 ENCOUNTER — OFFICE VISIT (OUTPATIENT)
Dept: FAMILY MEDICINE CLINIC | Facility: CLINIC | Age: 66
End: 2021-06-07

## 2021-06-07 VITALS
TEMPERATURE: 98 F | BODY MASS INDEX: 36.06 KG/M2 | HEART RATE: 73 BPM | DIASTOLIC BLOOD PRESSURE: 78 MMHG | OXYGEN SATURATION: 90 % | SYSTOLIC BLOOD PRESSURE: 124 MMHG | WEIGHT: 191 LBS | HEIGHT: 61 IN

## 2021-06-07 DIAGNOSIS — R73.09 ELEVATED GLUCOSE LEVEL: Primary | ICD-10-CM

## 2021-06-07 LAB — HBA1C MFR BLD: 5.53 % (ref 4.8–5.6)

## 2021-06-07 PROCEDURE — 99214 OFFICE O/P EST MOD 30 MIN: CPT | Performed by: NURSE PRACTITIONER

## 2021-06-07 PROCEDURE — 83036 HEMOGLOBIN GLYCOSYLATED A1C: CPT | Performed by: NURSE PRACTITIONER

## 2021-06-07 RX ORDER — MULTIPLE VITAMINS W/ MINERALS TAB 9MG-400MCG
1 TAB ORAL DAILY
COMMUNITY

## 2021-06-07 RX ORDER — DICYCLOMINE HYDROCHLORIDE 10 MG/1
10 CAPSULE ORAL 3 TIMES DAILY
COMMUNITY
End: 2021-09-10

## 2021-06-07 RX ORDER — IBUPROFEN 200 MG
1 CAPSULE ORAL 2 TIMES DAILY
COMMUNITY
Start: 2021-05-24 | End: 2021-09-03 | Stop reason: SDUPTHER

## 2021-06-07 RX ORDER — CARVEDILOL 3.12 MG/1
3.12 TABLET ORAL 2 TIMES DAILY
COMMUNITY
End: 2021-09-10 | Stop reason: SDUPTHER

## 2021-06-07 RX ORDER — MELATONIN
2 EVERY MORNING
COMMUNITY
End: 2021-09-10 | Stop reason: SDUPTHER

## 2021-06-07 RX ORDER — TOLTERODINE 4 MG/1
4 CAPSULE, EXTENDED RELEASE ORAL DAILY
COMMUNITY
End: 2021-09-10 | Stop reason: SDUPTHER

## 2021-06-07 RX ORDER — LORATADINE 10 MG/1
1 TABLET ORAL NIGHTLY
COMMUNITY
Start: 2021-05-24 | End: 2022-01-12 | Stop reason: SDUPTHER

## 2021-06-07 RX ORDER — ATORVASTATIN CALCIUM 40 MG/1
40 TABLET, FILM COATED ORAL NIGHTLY
COMMUNITY
End: 2021-09-10 | Stop reason: SDUPTHER

## 2021-06-07 RX ORDER — NIFEDIPINE 60 MG/1
60 TABLET, FILM COATED, EXTENDED RELEASE ORAL DAILY
COMMUNITY
End: 2021-09-03 | Stop reason: SDUPTHER

## 2021-06-07 RX ORDER — AMOXICILLIN AND CLAVULANATE POTASSIUM 875; 125 MG/1; MG/1
1 TABLET, FILM COATED ORAL 2 TIMES DAILY
COMMUNITY
End: 2021-09-10

## 2021-06-07 NOTE — PROGRESS NOTES
"Chief Complaint  Dizziness and Hyperglycemia    Subjective          Vanna Gil presents to [facility]  History of Present Illness   She presents today as an acute visit.  She states that her sugar was high when she went to the emergency room on Friday.  She went to the emergency room due to stomach pain.  She states she had loose stools on Friday and vomited.  When she was at the emergency room her sugar was 202.  She did have a CT scan of the abdomen that did show inflammation in the stomach and intestine she was put on dicyclomine and Augmentin.  She states that her stomach is feeling better.    Objective   Vital Signs:   /78   Pulse 73   Temp 98 °F (36.7 °C)   Ht 154.9 cm (61\")   Wt 86.6 kg (191 lb)   SpO2 90%   BMI 36.09 kg/m²     Physical Exam  Vitals reviewed.   Constitutional:       Appearance: Normal appearance. She is well-developed.   Neck:      Thyroid: No thyroid mass, thyromegaly or thyroid tenderness.   Cardiovascular:      Rate and Rhythm: Normal rate and regular rhythm.      Heart sounds: No murmur heard.   No friction rub. No gallop.    Pulmonary:      Effort: Pulmonary effort is normal.      Breath sounds: Normal breath sounds. No wheezing or rhonchi.   Musculoskeletal:      Cervical back: Normal range of motion and neck supple.   Lymphadenopathy:      Cervical: No cervical adenopathy.   Skin:     General: Skin is warm and dry.   Neurological:      Mental Status: She is alert and oriented to person, place, and time.      Cranial Nerves: No cranial nerve deficit.   Psychiatric:         Mood and Affect: Mood and affect normal.         Behavior: Behavior normal.         Thought Content: Thought content normal. Thought content does not include homicidal or suicidal ideation.         Judgment: Judgment normal.        Result Review :     CMP    CMP 7/6/20 3/9/21 6/4/21   Glucose 106 (A) 92 208 (A)   BUN 16 24 24   Creatinine 0.86 0.81 0.89   Sodium 140 " 140 138   Potassium 4.0 4.2 4.2   Chloride 106 102 100   Calcium 9.2 9.4 9.9   Albumin 4.1 4.1 4.3   Total Bilirubin 0.30 0.23 0.41   Alkaline Phosphatase 80 116 112   AST (SGOT) 20 23 25   ALT (SGPT) 14 15 17   (A) Abnormal value            CBC    CBC 7/13/20 3/9/21 6/4/21   WBC 5.05 5.64 15.42 (A)   RBC 4.00 (A) 4.41 4.64   Hemoglobin 12.2 13.1 14.1   Hematocrit 37.7 40.0 40.9   MCV 94.3 90.7 88.1   MCH 30.5 29.7 30.4   MCHC 32.4 (A) 32.8 (A) 34.5   RDW 12.8 13.2 13.4   Platelets 266 268 265   (A) Abnormal value            Data reviewed: Radiologic studies CT abdomen and pelvis and Recent hospitalization notes ED notes.          Assessment and Plan    Diagnoses and all orders for this visit:    1. Elevated glucose level (Primary)  -     Hemoglobin A1c        Follow Up   No follow-ups on file.  Patient was given instructions and counseling regarding her condition or for health maintenance advice. Please see specific information pulled into the AVS if appropriate.

## 2021-09-03 ENCOUNTER — TELEPHONE (OUTPATIENT)
Dept: FAMILY MEDICINE CLINIC | Facility: CLINIC | Age: 66
End: 2021-09-03

## 2021-09-03 RX ORDER — IBUPROFEN 200 MG
1 CAPSULE ORAL 2 TIMES DAILY
Qty: 180 TABLET | Refills: 3 | Status: SHIPPED | OUTPATIENT
Start: 2021-09-03 | End: 2022-09-23 | Stop reason: SDUPTHER

## 2021-09-03 RX ORDER — NIFEDIPINE 60 MG/1
60 TABLET, FILM COATED, EXTENDED RELEASE ORAL DAILY
Qty: 90 TABLET | Refills: 3 | Status: SHIPPED | OUTPATIENT
Start: 2021-09-03 | End: 2022-09-23 | Stop reason: SDUPTHER

## 2021-09-10 ENCOUNTER — OFFICE VISIT (OUTPATIENT)
Dept: FAMILY MEDICINE CLINIC | Facility: CLINIC | Age: 66
End: 2021-09-10

## 2021-09-10 VITALS
WEIGHT: 191.6 LBS | HEART RATE: 78 BPM | SYSTOLIC BLOOD PRESSURE: 132 MMHG | BODY MASS INDEX: 36.17 KG/M2 | DIASTOLIC BLOOD PRESSURE: 88 MMHG | TEMPERATURE: 97.3 F | HEIGHT: 61 IN | OXYGEN SATURATION: 96 %

## 2021-09-10 DIAGNOSIS — H69.82 EUSTACHIAN TUBE DYSFUNCTION, LEFT: ICD-10-CM

## 2021-09-10 DIAGNOSIS — I10 ESSENTIAL HYPERTENSION: Primary | ICD-10-CM

## 2021-09-10 DIAGNOSIS — Z12.31 ENCOUNTER FOR SCREENING MAMMOGRAM FOR MALIGNANT NEOPLASM OF BREAST: ICD-10-CM

## 2021-09-10 DIAGNOSIS — Z78.0 POSTMENOPAUSAL: ICD-10-CM

## 2021-09-10 DIAGNOSIS — D64.9 ANEMIA, UNSPECIFIED TYPE: ICD-10-CM

## 2021-09-10 DIAGNOSIS — E78.2 MIXED HYPERLIPIDEMIA: ICD-10-CM

## 2021-09-10 DIAGNOSIS — M65.30 TRIGGER FINGER, UNSPECIFIED FINGER, UNSPECIFIED LATERALITY: ICD-10-CM

## 2021-09-10 DIAGNOSIS — R53.83 FATIGUE, UNSPECIFIED TYPE: ICD-10-CM

## 2021-09-10 DIAGNOSIS — R42 LIGHTHEADEDNESS: ICD-10-CM

## 2021-09-10 PROBLEM — N31.8 HYPERTONICITY OF BLADDER: Status: ACTIVE | Noted: 2020-02-14

## 2021-09-10 PROBLEM — F32.A DEPRESSION: Status: ACTIVE | Noted: 2020-02-14

## 2021-09-10 PROBLEM — F41.9 ANXIETY: Status: ACTIVE | Noted: 2021-09-10

## 2021-09-10 PROBLEM — K21.9 GASTROESOPHAGEAL REFLUX DISEASE: Status: ACTIVE | Noted: 2019-06-21

## 2021-09-10 PROBLEM — M19.90 ARTHRITIS: Status: ACTIVE | Noted: 2021-09-10

## 2021-09-10 PROBLEM — K46.9 ABDOMINAL HERNIA: Status: ACTIVE | Noted: 2021-09-10

## 2021-09-10 PROBLEM — H91.90 HEARING LOSS: Status: ACTIVE | Noted: 2021-09-10

## 2021-09-10 PROBLEM — J45.909 ASTHMA: Status: ACTIVE | Noted: 2021-09-10

## 2021-09-10 PROBLEM — H69.92 EUSTACHIAN TUBE DYSFUNCTION, LEFT: Status: ACTIVE | Noted: 2021-09-10

## 2021-09-10 PROBLEM — E78.5 HYPERLIPIDEMIA: Status: ACTIVE | Noted: 2021-09-10

## 2021-09-10 LAB
ALBUMIN SERPL-MCNC: 4.7 G/DL (ref 3.5–5.2)
ALBUMIN/GLOB SERPL: 1.8 G/DL
ALP SERPL-CCNC: 101 U/L (ref 39–117)
ALT SERPL W P-5'-P-CCNC: 26 U/L (ref 1–33)
ANION GAP SERPL CALCULATED.3IONS-SCNC: 8.2 MMOL/L (ref 5–15)
AST SERPL-CCNC: 27 U/L (ref 1–32)
BASOPHILS # BLD AUTO: 0.1 10*3/MM3 (ref 0–0.2)
BASOPHILS NFR BLD AUTO: 1.6 % (ref 0–1.5)
BILIRUB SERPL-MCNC: 0.3 MG/DL (ref 0–1.2)
BUN SERPL-MCNC: 17 MG/DL (ref 8–23)
BUN/CREAT SERPL: 15.3 (ref 7–25)
CALCIUM SPEC-SCNC: 10.2 MG/DL (ref 8.6–10.5)
CHLORIDE SERPL-SCNC: 100 MMOL/L (ref 98–107)
CHOLEST SERPL-MCNC: 196 MG/DL (ref 0–200)
CO2 SERPL-SCNC: 27.8 MMOL/L (ref 22–29)
CREAT SERPL-MCNC: 1.11 MG/DL (ref 0.57–1)
DEPRECATED RDW RBC AUTO: 45.7 FL (ref 37–54)
EOSINOPHIL # BLD AUTO: 0.35 10*3/MM3 (ref 0–0.4)
EOSINOPHIL NFR BLD AUTO: 5.8 % (ref 0.3–6.2)
ERYTHROCYTE [DISTWIDTH] IN BLOOD BY AUTOMATED COUNT: 13.3 % (ref 12.3–15.4)
FERRITIN SERPL-MCNC: 42.9 NG/ML (ref 13–150)
FOLATE SERPL-MCNC: >20 NG/ML (ref 4.78–24.2)
GFR SERPL CREATININE-BSD FRML MDRD: 49 ML/MIN/1.73
GLOBULIN UR ELPH-MCNC: 2.6 GM/DL
GLUCOSE SERPL-MCNC: 86 MG/DL (ref 65–99)
HCT VFR BLD AUTO: 42.6 % (ref 34–46.6)
HDLC SERPL-MCNC: 55 MG/DL (ref 40–60)
HGB BLD-MCNC: 14.3 G/DL (ref 12–15.9)
IMM GRANULOCYTES # BLD AUTO: 0.04 10*3/MM3 (ref 0–0.05)
IMM GRANULOCYTES NFR BLD AUTO: 0.7 % (ref 0–0.5)
LDLC SERPL CALC-MCNC: 106 MG/DL (ref 0–100)
LDLC/HDLC SERPL: 1.81 {RATIO}
LYMPHOCYTES # BLD AUTO: 2.12 10*3/MM3 (ref 0.7–3.1)
LYMPHOCYTES NFR BLD AUTO: 34.9 % (ref 19.6–45.3)
MCH RBC QN AUTO: 31.2 PG (ref 26.6–33)
MCHC RBC AUTO-ENTMCNC: 33.6 G/DL (ref 31.5–35.7)
MCV RBC AUTO: 93 FL (ref 79–97)
MONOCYTES # BLD AUTO: 0.62 10*3/MM3 (ref 0.1–0.9)
MONOCYTES NFR BLD AUTO: 10.2 % (ref 5–12)
NEUTROPHILS NFR BLD AUTO: 2.84 10*3/MM3 (ref 1.7–7)
NEUTROPHILS NFR BLD AUTO: 46.8 % (ref 42.7–76)
NRBC BLD AUTO-RTO: 0 /100 WBC (ref 0–0.2)
PLATELET # BLD AUTO: 252 10*3/MM3 (ref 140–450)
PMV BLD AUTO: 11.8 FL (ref 6–12)
POTASSIUM SERPL-SCNC: 4.3 MMOL/L (ref 3.5–5.2)
PROT SERPL-MCNC: 7.3 G/DL (ref 6–8.5)
RBC # BLD AUTO: 4.58 10*6/MM3 (ref 3.77–5.28)
SODIUM SERPL-SCNC: 136 MMOL/L (ref 136–145)
T4 FREE SERPL-MCNC: 0.92 NG/DL (ref 0.93–1.7)
TRIGL SERPL-MCNC: 206 MG/DL (ref 0–150)
TSH SERPL DL<=0.05 MIU/L-ACNC: 2.6 UIU/ML (ref 0.27–4.2)
VIT B12 BLD-MCNC: 673 PG/ML (ref 211–946)
VLDLC SERPL-MCNC: 35 MG/DL (ref 5–40)
WBC # BLD AUTO: 6.07 10*3/MM3 (ref 3.4–10.8)

## 2021-09-10 PROCEDURE — 85025 COMPLETE CBC W/AUTO DIFF WBC: CPT | Performed by: NURSE PRACTITIONER

## 2021-09-10 PROCEDURE — 83540 ASSAY OF IRON: CPT | Performed by: NURSE PRACTITIONER

## 2021-09-10 PROCEDURE — 99214 OFFICE O/P EST MOD 30 MIN: CPT | Performed by: NURSE PRACTITIONER

## 2021-09-10 PROCEDURE — 84443 ASSAY THYROID STIM HORMONE: CPT | Performed by: NURSE PRACTITIONER

## 2021-09-10 PROCEDURE — 84439 ASSAY OF FREE THYROXINE: CPT | Performed by: NURSE PRACTITIONER

## 2021-09-10 PROCEDURE — 80061 LIPID PANEL: CPT | Performed by: NURSE PRACTITIONER

## 2021-09-10 PROCEDURE — 82746 ASSAY OF FOLIC ACID SERUM: CPT | Performed by: NURSE PRACTITIONER

## 2021-09-10 PROCEDURE — 82607 VITAMIN B-12: CPT | Performed by: NURSE PRACTITIONER

## 2021-09-10 PROCEDURE — 84466 ASSAY OF TRANSFERRIN: CPT | Performed by: NURSE PRACTITIONER

## 2021-09-10 PROCEDURE — 82728 ASSAY OF FERRITIN: CPT | Performed by: NURSE PRACTITIONER

## 2021-09-10 PROCEDURE — 80053 COMPREHEN METABOLIC PANEL: CPT | Performed by: NURSE PRACTITIONER

## 2021-09-10 RX ORDER — HYDROXYZINE HYDROCHLORIDE 25 MG/1
25 TABLET, FILM COATED ORAL 2 TIMES DAILY PRN
COMMUNITY
End: 2022-04-11

## 2021-09-10 RX ORDER — ATORVASTATIN CALCIUM 40 MG/1
40 TABLET, FILM COATED ORAL NIGHTLY
Qty: 90 TABLET | Refills: 3 | Status: SHIPPED | OUTPATIENT
Start: 2021-09-10 | End: 2022-09-23 | Stop reason: SDUPTHER

## 2021-09-10 RX ORDER — CARVEDILOL 3.12 MG/1
3.12 TABLET ORAL 2 TIMES DAILY
Qty: 180 TABLET | Refills: 3 | Status: SHIPPED | OUTPATIENT
Start: 2021-09-10 | End: 2022-09-23 | Stop reason: SDUPTHER

## 2021-09-10 RX ORDER — MELATONIN
2000 EVERY MORNING
Qty: 180 TABLET | Refills: 3 | Status: SHIPPED | OUTPATIENT
Start: 2021-09-10 | End: 2021-11-03 | Stop reason: SDUPTHER

## 2021-09-10 RX ORDER — FLUTICASONE PROPIONATE 50 MCG
2 SPRAY, SUSPENSION (ML) NASAL DAILY
Qty: 16 G | Refills: 5 | Status: SHIPPED | OUTPATIENT
Start: 2021-09-10 | End: 2022-03-11 | Stop reason: SINTOL

## 2021-09-10 RX ORDER — TOLTERODINE 4 MG/1
4 CAPSULE, EXTENDED RELEASE ORAL DAILY
Qty: 90 CAPSULE | Refills: 3 | Status: SHIPPED | OUTPATIENT
Start: 2021-09-10 | End: 2022-09-23 | Stop reason: SDUPTHER

## 2021-09-10 NOTE — PATIENT INSTRUCTIONS
Trigger Finger    Trigger finger, also called stenosing tenosynovitis,  is a condition that causes a finger to get stuck in a bent position. Each finger has a tendon, which is a tough, cord-like tissue that connects muscle to bone, and each tendon passes through a tunnel of tissue called a tendon sheath.  To move your finger, your tendon needs to glide freely through the sheath. Trigger finger happens when the tendon or the sheath thickens, making it difficult to move your finger. Trigger finger can affect any finger or a thumb. It may affect more than one finger. Mild cases may clear up with rest and medicine. Severe cases require more treatment.  What are the causes?  Trigger finger is caused by a thickened finger tendon or tendon sheath. The cause of this thickening is not known.  What increases the risk?  The following factors may make you more likely to develop this condition:  · Doing activities that require a strong .  · Having rheumatoid arthritis, gout, or diabetes.  · Being 40-60 years old.  · Being female.  What are the signs or symptoms?  Symptoms of this condition include:  · Pain when bending or straightening your finger.  · Tenderness or swelling where your finger attaches to the palm of your hand.  · A lump in the palm of your hand or on the inside of your finger.  · Hearing a noise like a pop or a snap when you try to straighten your finger.  · Feeling a catching or locking sensation when you try to straighten your finger.  · Being unable to straighten your finger.  How is this diagnosed?  This condition is diagnosed based on your symptoms and a physical exam.  How is this treated?  This condition may be treated by:  · Resting your finger and avoiding activities that make symptoms worse.  · Wearing a finger splint to keep your finger extended.  · Taking NSAIDs, such as ibuprofen, to relieve pain and swelling.  · Doing gentle exercises to stretch the finger as told by your health care  provider.  · Having medicine that reduces swelling and inflammation (steroids) injected into the tendon sheath. Injections may need to be repeated.  · Having surgery to open the tendon sheath. This may be done if other treatments do not work and you cannot straighten your finger. You may need physical therapy after surgery.  Follow these instructions at home:  If you have a splint:  · Wear the splint as told by your health care provider. Remove it only as told by your health care provider.  · Loosen it if your fingers tingle, become numb, or turn cold and blue.  · Keep it clean.  · If the splint is not waterproof:  ? Do not let it get wet.  ? Cover it with a watertight covering when you take a bath or shower.  Managing pain, stiffness, and swelling         If directed, apply heat to the affected area as often as told by your health care provider. Use the heat source that your health care provider recommends, such as a moist heat pack or a heating pad.  · Place a towel between your skin and the heat source.  · Leave the heat on for 20-30 minutes.  · Remove the heat if your skin turns bright red. This is especially important if you are unable to feel pain, heat, or cold. You may have a greater risk of getting burned.  If directed, put ice on the painful area. To do this:  · If you have a removable splint, remove it as told by your health care provider.  · Put ice in a plastic bag.  · Place a towel between your skin and the bag or between your splint and the bag.  · Leave the ice on for 20 minutes, 2-3 times a day.    Activity  · Rest your finger as told by your health care provider. Avoid activities that make the pain worse.  · Return to your normal activities as told by your health care provider. Ask your health care provider what activities are safe for you.  · Do exercises as told by your health care provider.  · Ask your health care provider when it is safe to drive if you have a splint on your hand.  General  instructions  · Take over-the-counter and prescription medicines only as told by your health care provider.  · Keep all follow-up visits as told by your health care provider. This is important.  Contact a health care provider if:  · Your symptoms are not improving with home care.  Summary  · Trigger finger, also called stenosing tenosynovitis, causes your finger to get stuck in a bent position. This can make it difficult and painful to straighten your finger.  · This condition develops when a finger tendon or tendon sheath thickens.  · Treatment may include resting your finger, wearing a splint, and taking medicines.  · In severe cases, surgery to open the tendon sheath may be needed.  This information is not intended to replace advice given to you by your health care provider. Make sure you discuss any questions you have with your health care provider.  Document Revised: 05/04/2020 Document Reviewed: 05/04/2020  [employer] Patient Education © 2021 [employer]    Fatigue  If you have fatigue, you feel tired all the time and have a lack of energy or a lack of motivation. Fatigue may make it difficult to start or complete tasks because of exhaustion. In general, occasional or mild fatigue is often a normal response to activity or life. However, long-lasting (chronic) or extreme fatigue may be a symptom of a medical condition.  Follow these instructions at home:  General instructions  · Watch your fatigue for any changes.  · Go to bed and get up at the same time every day.  · Avoid fatigue by pacing yourself during the day and getting enough sleep at night.  · Maintain a healthy weight.  Medicines  · Take over-the-counter and prescription medicines only as told by your health care provider.  · Take a multivitamin, if told by your health care provider.   · Do not use herbal or dietary supplements unless they are approved by your health care provider.  Activity    · Exercise regularly, as told by your health care  provider.  · Use or practice techniques to help you relax, such as yoga, alex chi, meditation, or massage therapy.    Eating and drinking    · Avoid heavy meals in the evening.  · Eat a well-balanced diet, which includes lean proteins, whole grains, plenty of fruits and vegetables, and low-fat dairy products.  · Avoid consuming too much caffeine.  · Avoid the use of alcohol.  · Drink enough fluid to keep your urine pale yellow.    Lifestyle  · Change situations that cause you stress. Try to keep your work and personal schedule in balance.  · Do not use any products that contain nicotine or tobacco, such as cigarettes and e-cigarettes. If you need help quitting, ask your health care provider.  · Do not use drugs.  Contact a health care provider if:  · Your fatigue does not get better.  · You have a fever.  · You suddenly lose or gain weight.  · You have headaches.  · You have trouble falling asleep or sleeping through the night.  · You feel angry, guilty, anxious, or sad.  · You are unable to have a bowel movement (constipation).  · Your skin is dry.  · You have swelling in your legs or another part of your body.  Get help right away if:  · You feel confused.  · Your vision is blurry.  · You feel faint or you pass out.  · You have a severe headache.  · You have severe pain in your abdomen, your back, or the area between your waist and hips (pelvis).  · You have chest pain, shortness of breath, or an irregular or fast heartbeat.  · You are unable to urinate, or you urinate less than normal.  · You have abnormal bleeding, such as bleeding from the rectum, vagina, nose, lungs, or nipples.  · You vomit blood.  · You have thoughts about hurting yourself or others.  If you ever feel like you may hurt yourself or others, or have thoughts about taking your own life, get help right away. You can go to your nearest emergency department or call:  · Your local emergency services (911 in the [place]  · A suicide crisis helpline,  such as the National Suicide Prevention Lifeline at 1-914.443.1970. This is open 24 hours a day.  Summary  · If you have fatigue, you feel tired all the time and have a lack of energy or a lack of motivation.  · Fatigue may make it difficult to start or complete tasks because of exhaustion.  · Long-lasting (chronic) or extreme fatigue may be a symptom of a medical condition.  · Exercise regularly, as told by your health care provider.  · Change situations that cause you stress. Try to keep your work and personal schedule in balance.  This information is not intended to replace advice given to you by your health care provider. Make sure you discuss any questions you have with your health care provider.  Document Revised: 07/08/2020 Document Reviewed: 09/12/2018  Elsevier Patient Education © 2021 [employer]    Eustachian Tube Dysfunction    Eustachian tube dysfunction refers to a condition in which a blockage develops in the narrow passage that connects the middle ear to the back of the nose (eustachian tube). The eustachian tube regulates air pressure in the middle ear by letting air move between the ear and nose. It also helps to drain fluid from the middle ear space.  Eustachian tube dysfunction can affect one or both ears. When the eustachian tube does not function properly, air pressure, fluid, or both can build up in the middle ear.  What are the causes?  This condition occurs when the eustachian tube becomes blocked or cannot open normally. Common causes of this condition include:  · Ear infections.  · Colds and other infections that affect the nose, mouth, and throat (upper respiratory tract).  · Allergies.  · Irritation from cigarette smoke.  · Irritation from stomach acid coming up into the esophagus (gastroesophageal reflux). The esophagus is the tube that carries food from the mouth to the stomach.  · Sudden changes in air pressure, such as from descending in an airplane or scuba diving.  · Abnormal  "growths in the nose or throat, such as:  ? Growths that line the nose (nasal polyps).  ? Abnormal growth of cells (tumors).  ? Enlarged tissue at the back of the throat (adenoids).  What increases the risk?  You are more likely to develop this condition if:  · You smoke.  · You are overweight.  · You are a child who has:  ? Certain birth defects of the mouth, such as cleft palate.  ? Large tonsils or adenoids.  What are the signs or symptoms?  Common symptoms of this condition include:  · A feeling of fullness in the ear.  · Ear pain.  · Clicking or popping noises in the ear.  · Ringing in the ear.  · Hearing loss.  · Loss of balance.  · Dizziness.  Symptoms may get worse when the air pressure around you changes, such as when you travel to an area of high elevation, fly on an airplane, or go scuba diving.  How is this diagnosed?  This condition may be diagnosed based on:  · Your symptoms.  · A physical exam of your ears, nose, and throat.  · Tests, such as those that measure:  ? The movement of your eardrum (tympanogram).  ? Your hearing (audiometry).  How is this treated?  Treatment depends on the cause and severity of your condition.  · In mild cases, you may relieve your symptoms by moving air into your ears. This is called \"popping the ears.\"  · In more severe cases, or if you have symptoms of fluid in your ears, treatment may include:  ? Medicines to relieve congestion (decongestants).  ? Medicines that treat allergies (antihistamines).  ? Nasal sprays or ear drops that contain medicines that reduce swelling (steroids).  ? A procedure to drain the fluid in your eardrum (myringotomy). In this procedure, a small tube is placed in the eardrum to:  § Drain the fluid.  § Restore the air in the middle ear space.  ? A procedure to insert a balloon device through the nose to inflate the opening of the eustachian tube (balloon dilation).  Follow these instructions at home:  Lifestyle  · Do not do any of the following " until your health care provider approves:  ? Travel to high altitudes.  ? Fly in airplanes.  ? Work in a pressurized cabin or room.  ? Scuba dive.  · Do not use any products that contain nicotine or tobacco, such as cigarettes and e-cigarettes. If you need help quitting, ask your health care provider.  · Keep your ears dry. Wear fitted earplugs during showering and bathing. Dry your ears completely after.  General instructions  · Take over-the-counter and prescription medicines only as told by your health care provider.  · Use techniques to help pop your ears as recommended by your health care provider. These may include:  ? Chewing gum.  ? Yawning.  ? Frequent, forceful swallowing.  ? Closing your mouth, holding your nose closed, and gently blowing as if you are trying to blow air out of your nose.  · Keep all follow-up visits as told by your health care provider. This is important.  Contact a health care provider if:  · Your symptoms do not go away after treatment.  · Your symptoms come back after treatment.  · You are unable to pop your ears.  · You have:  ? A fever.  ? Pain in your ear.  ? Pain in your head or neck.  ? Fluid draining from your ear.  · Your hearing suddenly changes.  · You become very dizzy.  · You lose your balance.  Summary  · Eustachian tube dysfunction refers to a condition in which a blockage develops in the eustachian tube.  · It can be caused by ear infections, allergies, inhaled irritants, or abnormal growths in the nose or throat.  · Symptoms include ear pain, hearing loss, or ringing in the ears.  · Mild cases are treated with maneuvers to unblock the ears, such as yawning or ear popping.  · Severe cases are treated with medicines. Surgery may also be done (rare).  This information is not intended to replace advice given to you by your health care provider. Make sure you discuss any questions you have with your health care provider.  Document Revised: 04/09/2019 Document Reviewed:  04/09/2019  Elsevier Patient Education © 2021 [employer] Inc[employer]

## 2021-09-10 NOTE — PROGRESS NOTES
"Chief Complaint  Follow-up (6 month), Hypertension, and Hyperlipidemia    Subjective          Vanna Gil presents to [facility]  History of Present Illness  She is here for 6-month follow-up.    Hypertension: Blood pressure stable on nifedipine 60 mg daily and carvedilol 3.125 mg twice daily.    Hyperlipidemia: She is currently on atorvastatin 40 mg nightly.  Her last lipid panel was 3/9/2021, triglycerides 202, total cholesterol 171, HDL 50, LDL 81.    History of overactive bladder: She is stable on Detrol 4 mg daily.    History of anemia: She is complaining of fatigue and lightheadedness.    She is postmenopausal and is due for a bone density scan.  She is also due for mammogram.    History of depression and anxiety: She is doing well on Zoloft.    She is complaining of trigger finger where her finger locks up and is annoying.    Patient was also seen by Aurora Doll, NP student.    Objective   Vital Signs:   /88   Pulse 78   Temp 97.3 °F (36.3 °C)   Ht 154.9 cm (61\")   Wt 86.9 kg (191 lb 9.6 oz)   SpO2 96%   BMI 36.20 kg/m²     Physical Exam  Vitals reviewed.   Constitutional:       Appearance: Normal appearance. She is well-developed.   HENT:      Right Ear: Tympanic membrane, ear canal and external ear normal. No tenderness. There is no impacted cerumen. Tympanic membrane is not erythematous.      Left Ear: Ear canal and external ear normal. No tenderness. There is no impacted cerumen. Tympanic membrane is bulging. Tympanic membrane is not erythematous.      Ears:      Comments: Left TM dull and with fluid bubbles.  Neck:      Thyroid: No thyroid mass, thyromegaly or thyroid tenderness.   Cardiovascular:      Rate and Rhythm: Normal rate and regular rhythm.      Heart sounds: No murmur heard.   No friction rub. No gallop.    Pulmonary:      Effort: Pulmonary effort is normal.      Breath sounds: Normal breath sounds. No wheezing or rhonchi.   Lymphadenopathy: "      Cervical: No cervical adenopathy.   Skin:     General: Skin is warm and dry.   Neurological:      Mental Status: She is alert and oriented to person, place, and time.      Cranial Nerves: No cranial nerve deficit.   Psychiatric:         Mood and Affect: Mood and affect normal.         Behavior: Behavior normal.         Thought Content: Thought content normal. Thought content does not include homicidal or suicidal ideation.         Judgment: Judgment normal.        Result Review :                 Assessment and Plan    Diagnoses and all orders for this visit:    1. Essential hypertension (Primary)  Assessment & Plan:  Hypertension is improving with treatment.  Continue current treatment regimen.  Continue current medications.  Blood pressure will be reassessed at the next regular appointment.    Orders:  -     Comprehensive Metabolic Panel  -     Lipid Panel    2. Mixed hyperlipidemia  Assessment & Plan:  Lipid abnormalities are improving with treatment.  Pharmacotherapy as ordered.  Lipids will be reassessed Today with labs.    Orders:  -     Comprehensive Metabolic Panel  -     Lipid Panel    3. Fatigue, unspecified type  Comments:  We will check labs today.  Orders:  -     TSH+Free T4    4. Lightheadedness  Comments:  I discussed her lightheadedness may be related to her eustachian tube dysfunction or anemia, labs obtained and started on Flonase.  Orders:  -     TSH+Free T4    5. Anemia, unspecified type  Assessment & Plan:  We will check her labs today to include CBC, iron profile, ferritin, and vitamin B12 and folate.    Orders:  -     CBC & Differential  -     Ferritin  -     Iron Profile  -     Vitamin B12 & Folate    6. Encounter for screening mammogram for malignant neoplasm of breast  -     Mammo Screening Digital Tomosynthesis Bilateral With CAD; Future    7. Postmenopausal  Assessment & Plan:  Bone density scan ordered.    Orders:  -     DEXA Bone Density Axial; Future    8. Eustachian tube  dysfunction, left  Assessment & Plan:  She is already taking loratadine 10 mg daily so I will start her on Flonase nasal spray daily.      9. Trigger finger, unspecified finger, unspecified laterality  Comments:  Patient will return for trigger joint injection per LATOSHA Ortiz.    Other orders  -     carvedilol (COREG) 3.125 MG tablet; Take 1 tablet by mouth 2 (two) times a day.  Dispense: 180 tablet; Refill: 3  -     atorvastatin (LIPITOR) 40 MG tablet; Take 1 tablet by mouth Every Night.  Dispense: 90 tablet; Refill: 3  -     tolterodine LA (DETROL LA) 4 MG 24 hr capsule; Take 1 capsule by mouth Daily.  Dispense: 90 capsule; Refill: 3  -     cholecalciferol (Cholecalciferol) 25 MCG (1000 UT) tablet; Take 2 tablets by mouth Every Morning.  Dispense: 180 tablet; Refill: 3  -     fluticasone (Flonase) 50 MCG/ACT nasal spray; 2 sprays into the nostril(s) as directed by provider Daily.  Dispense: 16 g; Refill: 5      Follow Up   Return in about 6 months (around 3/10/2022) for Next scheduled follow up.  Patient was given instructions and counseling regarding her condition or for health maintenance advice. Please see specific information pulled into the AVS if appropriate.

## 2021-09-10 NOTE — ASSESSMENT & PLAN NOTE
Lipid abnormalities are improving with treatment.  Pharmacotherapy as ordered.  Lipids will be reassessed Today with labs.

## 2021-09-11 LAB
IRON 24H UR-MRATE: 98 MCG/DL (ref 37–145)
IRON SATN MFR SERPL: 30 % (ref 20–50)
TIBC SERPL-MCNC: 323 MCG/DL (ref 298–536)
TRANSFERRIN SERPL-MCNC: 217 MG/DL (ref 200–360)

## 2021-09-22 ENCOUNTER — PROCEDURE VISIT (OUTPATIENT)
Dept: FAMILY MEDICINE CLINIC | Facility: CLINIC | Age: 66
End: 2021-09-22

## 2021-09-22 VITALS
HEART RATE: 76 BPM | BODY MASS INDEX: 36.29 KG/M2 | WEIGHT: 192.2 LBS | OXYGEN SATURATION: 92 % | TEMPERATURE: 96.8 F | HEIGHT: 61 IN | SYSTOLIC BLOOD PRESSURE: 136 MMHG | DIASTOLIC BLOOD PRESSURE: 88 MMHG

## 2021-09-22 DIAGNOSIS — M65.341 TRIGGER RING FINGER OF RIGHT HAND: Primary | ICD-10-CM

## 2021-09-22 PROCEDURE — 20553 NJX 1/MLT TRIGGER POINTS 3/>: CPT | Performed by: NURSE PRACTITIONER

## 2021-09-22 NOTE — PROGRESS NOTES
Chief Complaint  Hand Pain    Subjective        Past Medical History:   Diagnosis Date   • Acid reflux disease    • Anemia    • Anxiety    • Arthritis    • Asthma    • Bladder disorder    • Colon polyp 2018    Precancerous   • Depression 2020   • Essential hypertension 2020   • H/O hernia repair    • Hearing loss    • Hemorrhoids    • Hyperlipemia    • Hyperlipidemia    • OAB (overactive bladder) 2020   • Obesity ?   • Pneumonia 2014   • Shortness of breath    • Visual impairment ?    Only for reading     Past Surgical History:   Procedure Laterality Date   • APPENDECTOMY     • BACK SURGERY     • BREAST BIOPSY Left    • COLONOSCOPY  2018    [facility] Repeat in 5 years   • ENDOSCOPY     • FRACTURE SURGERY  2020    Distal Radius Fracture - Right Wrist   • HERNIA REPAIR     • JOINT REPLACEMENT      Artificial Joints/Limbs   • KNEE ARTHROPLASTY, PARTIAL REPLACEMENT      Meniscus Tear   • SPINE SURGERY     • TONSILLECTOMY       Social History     Socioeconomic History   • Marital status:      Spouse name: Not on file   • Number of children: Not on file   • Years of education: Not on file   • Highest education level: Not on file   Tobacco Use   • Smoking status: Light Tobacco Smoker     Packs/day: 0.20     Years: 30.00     Pack years: 6.00     Types: Cigarettes     Start date: 1973     Last attempt to quit:      Years since quittin.8   • Smokeless tobacco: Never Used   • Tobacco comment: former smoker   Vaping Use   • Vaping Use: Never used   Substance and Sexual Activity   • Alcohol use: Yes     Alcohol/week: 1.0 standard drinks     Types: 1 Glasses of wine per week     Comment: currently some days; occasionally dinks, less than 1 drink per day   • Drug use: Not Currently     Types: Marijuana     Comment: Only a couple off times when  i was around 18.   • Sexual activity: Not Currently     Partners: Male     Birth control/protection: Other     Comment: The Pill ...  "To old now. Not interested anymore.       Vanna Gil presents to [facility]  Patient comes in today for a trigger finger joint injection.  The locking occurs in the right ring finger.  Its been intermittent for the past several weeks.  Denies any localized swelling or erythemic changes to the joint.  She is a patient of Ibeth Wilder, I am seeing her today for the trigger finger injection only.  Verbal consent obtained per patient timeout, patient did point to correct site.         Objective   Vital Signs:   /88 (BP Location: Left arm, Patient Position: Sitting, Cuff Size: Adult)   Pulse 76   Temp 96.8 °F (36 °C) (Temporal)   Ht 154.9 cm (61\")   Wt 87.2 kg (192 lb 3.2 oz)   SpO2 92%   BMI 36.32 kg/m²     Physical Exam  Constitutional:       General: She is not in acute distress.     Appearance: Normal appearance. She is obese. She is not ill-appearing.   Musculoskeletal:      Left hand: Tenderness present. No swelling or deformity. Normal sensation.      Comments: Right ring finger   Neurological:      Mental Status: She is alert.   Psychiatric:         Mood and Affect: Mood normal.         Behavior: Behavior normal.         Thought Content: Thought content normal.        Result Review :   The following data was reviewed by: LATOSHA Watters on 09/22/2021:  Common labs    Common Labsle 6/4/21 6/4/21 6/7/21 9/10/21 9/10/21 9/10/21    2326 2326  1440 1440 1440   Glucose     86    Glucose  208 (A)       BUN  24   17    Creatinine  0.89   1.11 (A)    eGFR Non  Am     49 (A)    Sodium  138   136    Potassium  4.2   4.3    Chloride  100   100    Calcium  9.9   10.2    Albumin  4.3   4.70    Total Bilirubin  0.41   0.3    Alkaline Phosphatase  112   101    AST (SGOT)  25   27    ALT (SGPT)  17   26    WBC 15.42 (A)   6.07     Hemoglobin 14.1   14.3     Hematocrit 40.9   42.6     Platelets 265   252     Total Cholesterol      196   Triglycerides      206 " (A)   HDL Cholesterol      55   LDL Cholesterol       106 (A)   Hemoglobin A1C   5.53      (A) Abnormal value               Inject Trigger Points, > 3    Date/Time: 9/22/2021 2:00 PM  Performed by: Jair Orr APRN  Authorized by: Jair Orr APRN   Preparation: Patient was prepped and draped in the usual sterile fashion.  Local anesthesia used: yes    Anesthesia:  Local anesthesia used: yes  Local Anesthetic: topical anesthetic    Sedation:  Patient sedated: no    Patient tolerance: patient tolerated the procedure well with no immediate complications  Comments: 05ml 1% lidocaine plain without epi #[id number], expiration date 9/2022   0.5ml 20mg of Kenalog lot number [id number], expiration date November 2022            Assessment and Plan    There are no diagnoses linked to this encounter.    Follow Up   No follow-ups on file.  Patient was given instructions and counseling regarding her condition or for health maintenance advice. Please see specific information pulled into the AVS if appropriate.

## 2021-11-03 ENCOUNTER — TELEPHONE (OUTPATIENT)
Dept: FAMILY MEDICINE CLINIC | Facility: CLINIC | Age: 66
End: 2021-11-03

## 2021-11-03 RX ORDER — MELATONIN
2000 EVERY MORNING
Qty: 180 TABLET | Refills: 3 | Status: SHIPPED | OUTPATIENT
Start: 2021-11-03

## 2021-11-03 NOTE — TELEPHONE ENCOUNTER
Caller: Vanna Gil    Relationship: Self    Best call back number: [phone]    What is the best time to reach you: ANYTIME    Who are you requesting to speak with (clinical staff, provider,  specific staff member): CLINICAL      What was the call regarding: THE PATIENT WOULD LIKE A CALL BACK TO DISCUSS WHEN SHE IS DUE FOR HER COLONOSCOPY AND HER VACCINATIONS, AND TO DISCUSS A SHINGLES SHOT    Do you require a callback: YES

## 2021-11-03 NOTE — TELEPHONE ENCOUNTER
Caller: Vanna Gil    Relationship: Self      Requested Prescriptions:   Requested Prescriptions     Pending Prescriptions Disp Refills   • cholecalciferol (Cholecalciferol) 25 MCG (1000 UT) tablet 180 tablet 3     Sig: Take 2 tablets by mouth Every Morning.        Pharmacy where request should be sent: [facility] [place] [facility] -  [place], [place] - [address] - [phone]  - [phone]   [phone]    Best call back number: [phone]    Does the patient have less than a 3 day supply:  [] Yes  [x] No    Radha Rivas Rep   11/03/21 11:57 EDT

## 2021-12-01 ENCOUNTER — HOSPITAL ENCOUNTER (OUTPATIENT)
Dept: BONE DENSITY | Facility: HOSPITAL | Age: 66
Discharge: HOME | End: 2021-12-01

## 2021-12-01 ENCOUNTER — HOSPITAL ENCOUNTER (OUTPATIENT)
Dept: MAMMOGRAPHY | Facility: HOSPITAL | Age: 66
Discharge: HOME | End: 2021-12-01

## 2021-12-01 DIAGNOSIS — Z78.0 POSTMENOPAUSAL: ICD-10-CM

## 2021-12-01 DIAGNOSIS — Z12.31 ENCOUNTER FOR SCREENING MAMMOGRAM FOR MALIGNANT NEOPLASM OF BREAST: ICD-10-CM

## 2021-12-01 PROCEDURE — 77063 BREAST TOMOSYNTHESIS BI: CPT

## 2021-12-01 PROCEDURE — 77067 SCR MAMMO BI INCL CAD: CPT

## 2022-01-12 ENCOUNTER — TELEPHONE (OUTPATIENT)
Dept: FAMILY MEDICINE CLINIC | Facility: CLINIC | Age: 67
End: 2022-01-12

## 2022-01-12 RX ORDER — LORATADINE 10 MG/1
10 TABLET ORAL NIGHTLY
Qty: 90 TABLET | Refills: 0 | Status: SHIPPED | OUTPATIENT
Start: 2022-01-12 | End: 2022-03-11 | Stop reason: SDUPTHER

## 2022-01-12 NOTE — TELEPHONE ENCOUNTER
NEEDING REFILL ON LORATADINE. SPOKE WITH SOMEONE ON Monday AND PHARMACY STILL DOESN'T HAVE. PLEASE [phone]

## 2022-02-15 ENCOUNTER — CLINICAL SUPPORT (OUTPATIENT)
Dept: GASTROENTEROLOGY | Facility: CLINIC | Age: 67
End: 2022-02-15

## 2022-02-15 ENCOUNTER — PREP FOR SURGERY (OUTPATIENT)
Dept: OTHER | Facility: HOSPITAL | Age: 67
End: 2022-02-15

## 2022-02-15 DIAGNOSIS — Z86.010 HISTORY OF COLON POLYPS: Primary | ICD-10-CM

## 2022-02-15 RX ORDER — PEG-3350, SODIUM SULFATE, SODIUM CHLORIDE, POTASSIUM CHLORIDE, SODIUM ASCORBATE AND ASCORBIC ACID 7.5-2.691G
1000 KIT ORAL EVERY 12 HOURS
Qty: 1 EACH | Refills: 0 | Status: SHIPPED | OUTPATIENT
Start: 2022-02-15 | End: 2023-03-16

## 2022-02-15 NOTE — PROGRESS NOTES
Vanna Gil     REASON FOR CALL-colonoscopy, history of colon polyps, last colon 2018 With Dr. Clarke    SENT IN PREP-moviprep  DOS-08/15/2022  Past Medical History:   Diagnosis Date   • Acid reflux disease    • Anemia    • Anxiety    • Arthritis    • Asthma    • Bladder disorder    • Colon polyp 2018    Precancerous   • Depression 2020   • Essential hypertension 2020   • H/O hernia repair    • Hearing loss    • Hemorrhoids    • Hyperlipemia    • Hyperlipidemia    • OAB (overactive bladder) 2020   • Obesity ?   • Pneumonia 2014   • Shortness of breath    • Visual impairment ?    Only for reading     Allergies   Allergen Reactions   • Levofloxacin Rash and Shortness Of Breath   • Methylprednisolone Other (See Comments)     Unsure of reaction  Possible tingling in head     • Naproxen Other (See Comments)     ' tickling on top of head'   • Naproxen Sodium Itching     Past Surgical History:   Procedure Laterality Date   • APPENDECTOMY     • BACK SURGERY     • BREAST BIOPSY Left    • COLONOSCOPY  2018    [facility] in 5 years   • ENDOSCOPY  2018   • FRACTURE SURGERY  2020    Distal Radius Fracture - Right Wrist   • HERNIA REPAIR     • JOINT REPLACEMENT      Artificial Joints/Limbs   • KNEE ARTHROPLASTY, PARTIAL REPLACEMENT      Meniscus Tear   • SPINE SURGERY     • TONSILLECTOMY       Social History     Socioeconomic History   • Marital status:    Tobacco Use   • Smoking status: Light Tobacco Smoker     Packs/day: 0.20     Years: 30.00     Pack years: 6.00     Types: Cigarettes     Start date: 1973     Last attempt to quit: 2004     Years since quittin.2   • Smokeless tobacco: Never Used   • Tobacco comment: former smoker   Vaping Use   • Vaping Use: Never used   Substance and Sexual Activity   • Alcohol use: Yes     Alcohol/week: 1.0 standard drink     Types: 1 Glasses of wine per week     Comment: currently some days; occasionally dinks, less than 1 drink per  day   • Drug use: Not Currently     Types: Marijuana     Comment: Only a couple off times when  i was around 18.   • Sexual activity: Not Currently     Partners: Male     Birth control/protection: Other     Comment: The Pill ... To old now. Not interested anymore.     Family History   Problem Relation Age of Onset   • Breast cancer Mother    • Cancer Mother         Breast   • Vision loss Mother         Only for reading   • Other Mother         Ms   • Heart disease Father         Heart Pacemaker   • Hearing loss Father         Hearing Loss   • Hyperlipidemia Father    • Vision loss Father         Only for reading   • Heart disease Brother    • Early death Brother         Heart Attack   • Heart disease Brother         Heart Pacemaker   • Vision loss Brother         Only for reading       Current Outpatient Medications:   •  atorvastatin (LIPITOR) 40 MG tablet, Take 1 tablet by mouth Every Night., Disp: 90 tablet, Rfl: 3  •  calcium carbonate (OS-AIMEE) 1250 (500 Ca) MG tablet, Take 1 tablet by mouth 2 (two) times a day., Disp: 180 tablet, Rfl: 3  •  carvedilol (COREG) 3.125 MG tablet, Take 1 tablet by mouth 2 (two) times a day., Disp: 180 tablet, Rfl: 3  •  cholecalciferol (Cholecalciferol) 25 MCG (1000 UT) tablet, Take 2 tablets by mouth Every Morning., Disp: 180 tablet, Rfl: 3  •  loratadine (CLARITIN) 10 MG tablet, Take 1 tablet by mouth Every Night., Disp: 90 tablet, Rfl: 0  •  multivitamin with minerals (MULTIVITAMIN ADULT PO), Take 1 tablet by mouth Daily., Disp: , Rfl:   •  NIFEdipine CC (ADALAT CC) 60 MG 24 hr tablet, Take 1 tablet by mouth Daily., Disp: 90 tablet, Rfl: 3  •  sertraline (Zoloft) 50 MG tablet, Take 1 tablet by mouth Every Morning., Disp: , Rfl:   •  tolterodine LA (DETROL LA) 4 MG 24 hr capsule, Take 1 capsule by mouth Daily., Disp: 90 capsule, Rfl: 3  •  fluticasone (Flonase) 50 MCG/ACT nasal spray, 2 sprays into the nostril(s) as directed by provider Daily., Disp: 16 g, Rfl: 5  •  hydrOXYzine  (ATARAX) 25 MG tablet, Take 25 mg by mouth 2 (Two) Times a Day As Needed for Itching., Disp: , Rfl:

## 2022-03-11 ENCOUNTER — OFFICE VISIT (OUTPATIENT)
Dept: FAMILY MEDICINE CLINIC | Facility: CLINIC | Age: 67
End: 2022-03-11

## 2022-03-11 VITALS
TEMPERATURE: 98.1 F | HEIGHT: 61 IN | OXYGEN SATURATION: 94 % | BODY MASS INDEX: 36.47 KG/M2 | HEART RATE: 68 BPM | WEIGHT: 193.2 LBS | SYSTOLIC BLOOD PRESSURE: 130 MMHG | DIASTOLIC BLOOD PRESSURE: 82 MMHG

## 2022-03-11 DIAGNOSIS — M54.12 RADICULOPATHY OF CERVICAL SPINE: ICD-10-CM

## 2022-03-11 DIAGNOSIS — Z23 NEED FOR SHINGLES VACCINE: ICD-10-CM

## 2022-03-11 DIAGNOSIS — Z11.59 NEED FOR HEPATITIS C SCREENING TEST: ICD-10-CM

## 2022-03-11 DIAGNOSIS — I10 ESSENTIAL HYPERTENSION: Primary | ICD-10-CM

## 2022-03-11 DIAGNOSIS — F32.0 CURRENT MILD EPISODE OF MAJOR DEPRESSIVE DISORDER WITHOUT PRIOR EPISODE: ICD-10-CM

## 2022-03-11 DIAGNOSIS — E78.2 MIXED HYPERLIPIDEMIA: ICD-10-CM

## 2022-03-11 LAB
ALBUMIN SERPL-MCNC: 4.7 G/DL (ref 3.5–5.2)
ALBUMIN/GLOB SERPL: 1.7 G/DL
ALP SERPL-CCNC: 104 U/L (ref 39–117)
ALT SERPL W P-5'-P-CCNC: 20 U/L (ref 1–33)
ANION GAP SERPL CALCULATED.3IONS-SCNC: 11.4 MMOL/L (ref 5–15)
AST SERPL-CCNC: 25 U/L (ref 1–32)
BILIRUB SERPL-MCNC: 0.4 MG/DL (ref 0–1.2)
BUN SERPL-MCNC: 19 MG/DL (ref 8–23)
BUN/CREAT SERPL: 25.3 (ref 7–25)
CALCIUM SPEC-SCNC: 10.3 MG/DL (ref 8.6–10.5)
CHLORIDE SERPL-SCNC: 103 MMOL/L (ref 98–107)
CHOLEST SERPL-MCNC: 190 MG/DL (ref 0–200)
CO2 SERPL-SCNC: 26.6 MMOL/L (ref 22–29)
CREAT SERPL-MCNC: 0.75 MG/DL (ref 0.57–1)
EGFRCR SERPLBLD CKD-EPI 2021: 87.9 ML/MIN/1.73
GLOBULIN UR ELPH-MCNC: 2.8 GM/DL
GLUCOSE SERPL-MCNC: 87 MG/DL (ref 65–99)
HCV AB SER DONR QL: NORMAL
HDLC SERPL-MCNC: 53 MG/DL (ref 40–60)
LDLC SERPL CALC-MCNC: 106 MG/DL (ref 0–100)
LDLC/HDLC SERPL: 1.91 {RATIO}
POTASSIUM SERPL-SCNC: 4 MMOL/L (ref 3.5–5.2)
PROT SERPL-MCNC: 7.5 G/DL (ref 6–8.5)
SODIUM SERPL-SCNC: 141 MMOL/L (ref 136–145)
T4 FREE SERPL-MCNC: 1.01 NG/DL (ref 0.93–1.7)
TRIGL SERPL-MCNC: 178 MG/DL (ref 0–150)
TSH SERPL DL<=0.05 MIU/L-ACNC: 2.05 UIU/ML (ref 0.27–4.2)
VLDLC SERPL-MCNC: 31 MG/DL (ref 5–40)

## 2022-03-11 PROCEDURE — 86803 HEPATITIS C AB TEST: CPT | Performed by: NURSE PRACTITIONER

## 2022-03-11 PROCEDURE — 80061 LIPID PANEL: CPT | Performed by: NURSE PRACTITIONER

## 2022-03-11 PROCEDURE — 99215 OFFICE O/P EST HI 40 MIN: CPT | Performed by: NURSE PRACTITIONER

## 2022-03-11 PROCEDURE — 84439 ASSAY OF FREE THYROXINE: CPT | Performed by: NURSE PRACTITIONER

## 2022-03-11 PROCEDURE — 84443 ASSAY THYROID STIM HORMONE: CPT | Performed by: NURSE PRACTITIONER

## 2022-03-11 PROCEDURE — 80053 COMPREHEN METABOLIC PANEL: CPT | Performed by: NURSE PRACTITIONER

## 2022-03-11 RX ORDER — LORATADINE 10 MG/1
10 TABLET ORAL NIGHTLY
Qty: 90 TABLET | Refills: 3 | Status: SHIPPED | OUTPATIENT
Start: 2022-03-11 | End: 2022-04-11 | Stop reason: SDUPTHER

## 2022-03-11 RX ORDER — AMITRIPTYLINE HYDROCHLORIDE 25 MG/1
25 TABLET, FILM COATED ORAL NIGHTLY
Qty: 90 TABLET | Refills: 0 | Status: SHIPPED | OUTPATIENT
Start: 2022-03-11 | End: 2022-04-11 | Stop reason: SDUPTHER

## 2022-03-11 RX ORDER — ZOSTER VACCINE RECOMBINANT, ADJUVANTED 50 MCG/0.5
0.5 KIT INTRAMUSCULAR ONCE
Qty: 1 EACH | Refills: 1 | Status: SHIPPED | OUTPATIENT
Start: 2022-03-11 | End: 2022-03-11

## 2022-03-11 NOTE — PROGRESS NOTES
Chief Complaint  6 month follow up, Hypertension, and Hyperlipidemia    Subjective          Vanna Gil presents to [facility]  History of Present Illness   66-year-old female presents today for 6-month follow-up on hypertension and hyperlipidemia.    She is complaining of a growth on the top of her left shoulder that started around the end of December 2021.    She is complaining of tingling in her fingers of both hands.  She states she had previous surgery on her right wrist and her left hand due to an accident.  She is complaining of radiculopathy of both arms to her hands.  She does complain of her neck feeling tight.    Hypertension: Blood pressure stable on meds as listed.    Hyperlipidemia: She is currently on atorvastatin 40 mg daily.  Her last LDL did increase slightly but still in the normal range.    Depression/anxiety: She is currently stable on Zoloft 50 mg daily.  She takes hydroxyzine as needed.  She is wanting to know if she can decrease the dose of her Zoloft to half a tablet.    She is wanting to get the shingles vaccine.  She has never had a hepatitis C screening.    Current Outpatient Medications on File Prior to Visit   Medication Sig Dispense Refill   • atorvastatin (LIPITOR) 40 MG tablet Take 1 tablet by mouth Every Night. 90 tablet 3   • calcium carbonate (OS-AIMEE) 1250 (500 Ca) MG tablet Take 1 tablet by mouth 2 (two) times a day. 180 tablet 3   • carvedilol (COREG) 3.125 MG tablet Take 1 tablet by mouth 2 (two) times a day. 180 tablet 3   • cholecalciferol (Cholecalciferol) 25 MCG (1000 UT) tablet Take 2 tablets by mouth Every Morning. 180 tablet 3   • hydrOXYzine (ATARAX) 25 MG tablet Take 25 mg by mouth 2 (Two) Times a Day As Needed for Itching.     • multivitamin with minerals tablet tablet Take 1 tablet by mouth Daily.     • NIFEdipine CC (ADALAT CC) 60 MG 24 hr tablet Take 1 tablet by mouth Daily. 90 tablet 3   • PEG-KCl-NaCl-NaSulf-Na Asc-C  "(MoviPrep) 100 g reconstituted solution powder Take 1,000 mL by mouth Every 12 (Twelve) Hours. 1 each 0   • tolterodine LA (DETROL LA) 4 MG 24 hr capsule Take 1 capsule by mouth Daily. 90 capsule 3   • [DISCONTINUED] loratadine (CLARITIN) 10 MG tablet Take 1 tablet by mouth Every Night. 90 tablet 0   • [DISCONTINUED] sertraline (ZOLOFT) 50 MG tablet Take 1 tablet by mouth Every Morning.     • [DISCONTINUED] fluticasone (Flonase) 50 MCG/ACT nasal spray 2 sprays into the nostril(s) as directed by provider Daily. 16 g 5     No current facility-administered medications on file prior to visit.       Objective   Vital Signs:   /82   Pulse 68   Temp 98.1 °F (36.7 °C)   Ht 154.9 cm (61\")   Wt 87.6 kg (193 lb 3.2 oz)   SpO2 94%   BMI 36.50 kg/m²     Physical Exam  Vitals reviewed.   Constitutional:       Appearance: Normal appearance. She is well-developed.   Neck:      Thyroid: No thyroid mass, thyromegaly or thyroid tenderness.   Cardiovascular:      Rate and Rhythm: Normal rate and regular rhythm.      Heart sounds: No murmur heard.    No friction rub. No gallop.   Pulmonary:      Effort: Pulmonary effort is normal.      Breath sounds: Normal breath sounds. No wheezing or rhonchi.   Musculoskeletal:      Right wrist: Tenderness present. No swelling.      Left wrist: Normal.      Right hand: Normal.      Left hand: No swelling.      Cervical back: Spasms present. No swelling or tenderness.      Comments: Left palmar hand with healed surgical scar noted.  Right wrist with healed surgical scar noted.   Lymphadenopathy:      Cervical: No cervical adenopathy.   Skin:     General: Skin is warm and dry.      Findings: Lesion present.      Comments: Approximately 2 to 3 mm raised, flesh colored lesion with rough texture noted to top of left shoulder.   Neurological:      Mental Status: She is alert and oriented to person, place, and time.      Cranial Nerves: No cranial nerve deficit.   Psychiatric:         Mood and " Affect: Mood and affect normal.         Behavior: Behavior normal.         Thought Content: Thought content normal. Thought content does not include homicidal or suicidal ideation.         Judgment: Judgment normal.        Result Review :     CMP    CMP 6/4/21 9/10/21   Glucose 208 (A) 86   BUN 24 17   Creatinine 0.89 1.11 (A)   eGFR Non African Am  49 (A)   Sodium 138 136   Potassium 4.2 4.3   Chloride 100 100   Calcium 9.9 10.2   Albumin 4.3 4.70   Total Bilirubin 0.41 0.3   Alkaline Phosphatase 112 101   AST (SGOT) 25 27   ALT (SGPT) 17 26   (A) Abnormal value            CBC    CBC 6/4/21 9/10/21   WBC 15.42 (A) 6.07   RBC 4.64 4.58   Hemoglobin 14.1 14.3   Hematocrit 40.9 42.6   MCV 88.1 93.0   MCH 30.4 31.2   MCHC 34.5 33.6   RDW 13.4 13.3   Platelets 265 252   (A) Abnormal value            Lipid Panel    Lipid Panel 9/10/21   Total Cholesterol 196   Triglycerides 206 (A)   HDL Cholesterol 55   VLDL Cholesterol 35   LDL Cholesterol  106 (A)   LDL/HDL Ratio 1.81   (A) Abnormal value                      Assessment and Plan    Diagnoses and all orders for this visit:    1. Essential hypertension (Primary)  Assessment & Plan:  Hypertension is improving with treatment.  Continue current treatment regimen.  Continue current medications.  Blood pressure will be reassessed at the next regular appointment.    Orders:  -     Lipid Panel  -     Comprehensive Metabolic Panel    2. Mixed hyperlipidemia  Assessment & Plan:  Lipid abnormalities are unchanged.  Pharmacotherapy as ordered.  We will check lipid panel today.  Lipids will be reassessed in 6 months.    Orders:  -     Lipid Panel  -     Comprehensive Metabolic Panel    3. Current mild episode of major depressive disorder without prior episode (HCC)  Assessment & Plan:  Patient's depression is single episode and is mild without psychosis. Their depression is currently active and the condition is improving with treatment. This will be reassessed in 4 weeks. F/U as  described:We were going to decrease her Zoloft to half a tablet but decided to start her on amitriptyline 25 mg daily at bedtime to help with her radiculopathy pain.  She was instructed to stop Zoloft and start amitriptyline..    Orders:  -     TSH+Free T4    4. Radiculopathy of cervical spine  Assessment & Plan:  I will start her on amitriptyline 25 mg to help with her radiculopathy pain.  We will reevaluate effectiveness on her next appointment in a month when she comes in for her Medicare wellness visit.  If she is not improving we will get imaging of cervical spine.      5. Need for hepatitis C screening test  -     Hepatitis C Antibody    6. Need for shingles vaccine  Comments:  Discussed shingles vaccine is 2 doses 2-6 months apart, advised no other vaccine within 2 weeks.    Other orders  -     Discontinue: sertraline (ZOLOFT) 50 MG tablet; Take 0.5 tablets by mouth Every Morning.  Dispense: 45 tablet; Refill: 1  -     loratadine (CLARITIN) 10 MG tablet; Take 1 tablet by mouth Every Night.  Dispense: 90 tablet; Refill: 3  -     Zoster Vac Recomb Adjuvanted (Shingrix) 50 MCG/0.5ML reconstituted suspension; Inject 0.5 mL into the appropriate muscle as directed by prescriber 1 (One) Time for 1 dose.  Dispense: 1 each; Refill: 1  -     amitriptyline (ELAVIL) 25 MG tablet; Take 1 tablet by mouth Every Night.  Dispense: 90 tablet; Refill: 0    I spent 44 minutes caring for Vanna on this date of service. This time includes time spent by me in the following activities:preparing for the visit, reviewing tests, performing a medically appropriate examination and/or evaluation , counseling and educating the patient/family/caregiver, ordering medications, tests, or procedures and documenting information in the medical record  Follow Up   Return for Medicare Wellness, 30 min apt for complex pt.  Patient was given instructions and counseling regarding her condition or for health maintenance advice. Please see specific  information pulled into the AVS if appropriate.

## 2022-03-11 NOTE — ASSESSMENT & PLAN NOTE
I will start her on amitriptyline 25 mg to help with her radiculopathy pain.  We will reevaluate effectiveness on her next appointment in a month when she comes in for her Medicare wellness visit.  If she is not improving we will get imaging of cervical spine.

## 2022-03-11 NOTE — ASSESSMENT & PLAN NOTE
Lipid abnormalities are unchanged.  Pharmacotherapy as ordered.  We will check lipid panel today.  Lipids will be reassessed in 6 months.

## 2022-03-11 NOTE — ASSESSMENT & PLAN NOTE
Patient's depression is single episode and is mild without psychosis. Their depression is currently active and the condition is improving with treatment. This will be reassessed in 4 weeks. F/U as described:We were going to decrease her Zoloft to half a tablet but decided to start her on amitriptyline 25 mg daily at bedtime to help with her radiculopathy pain.  She was instructed to stop Zoloft and start amitriptyline..

## 2022-04-11 ENCOUNTER — OFFICE VISIT (OUTPATIENT)
Dept: FAMILY MEDICINE CLINIC | Facility: CLINIC | Age: 67
End: 2022-04-11

## 2022-04-11 VITALS
OXYGEN SATURATION: 97 % | SYSTOLIC BLOOD PRESSURE: 118 MMHG | HEART RATE: 75 BPM | WEIGHT: 191 LBS | TEMPERATURE: 97.5 F | DIASTOLIC BLOOD PRESSURE: 72 MMHG | BODY MASS INDEX: 36.06 KG/M2 | HEIGHT: 61 IN

## 2022-04-11 DIAGNOSIS — Z00.00 ENCOUNTER FOR SUBSEQUENT ANNUAL WELLNESS VISIT (AWV) IN MEDICARE PATIENT: Primary | ICD-10-CM

## 2022-04-11 DIAGNOSIS — K21.00 GASTROESOPHAGEAL REFLUX DISEASE WITH ESOPHAGITIS WITHOUT HEMORRHAGE: ICD-10-CM

## 2022-04-11 DIAGNOSIS — M54.12 RADICULOPATHY OF CERVICAL SPINE: ICD-10-CM

## 2022-04-11 DIAGNOSIS — F32.0 CURRENT MILD EPISODE OF MAJOR DEPRESSIVE DISORDER WITHOUT PRIOR EPISODE: ICD-10-CM

## 2022-04-11 PROCEDURE — 1159F MED LIST DOCD IN RCRD: CPT | Performed by: NURSE PRACTITIONER

## 2022-04-11 PROCEDURE — 1170F FXNL STATUS ASSESSED: CPT | Performed by: NURSE PRACTITIONER

## 2022-04-11 PROCEDURE — 99214 OFFICE O/P EST MOD 30 MIN: CPT | Performed by: NURSE PRACTITIONER

## 2022-04-11 PROCEDURE — G0439 PPPS, SUBSEQ VISIT: HCPCS | Performed by: NURSE PRACTITIONER

## 2022-04-11 PROCEDURE — 1126F AMNT PAIN NOTED NONE PRSNT: CPT | Performed by: NURSE PRACTITIONER

## 2022-04-11 RX ORDER — PANTOPRAZOLE SODIUM 40 MG/1
40 TABLET, DELAYED RELEASE ORAL DAILY
Qty: 90 TABLET | Refills: 0 | Status: SHIPPED | OUTPATIENT
Start: 2022-04-11 | End: 2022-04-18 | Stop reason: SDUPTHER

## 2022-04-11 RX ORDER — LORATADINE 10 MG/1
10 TABLET ORAL NIGHTLY
Qty: 90 TABLET | Refills: 2 | Status: SHIPPED | OUTPATIENT
Start: 2022-04-11 | End: 2022-11-16

## 2022-04-11 RX ORDER — AMITRIPTYLINE HYDROCHLORIDE 25 MG/1
25 TABLET, FILM COATED ORAL NIGHTLY
Qty: 90 TABLET | Refills: 0 | Status: SHIPPED | OUTPATIENT
Start: 2022-04-11 | End: 2022-06-29 | Stop reason: SDUPTHER

## 2022-04-11 NOTE — PROGRESS NOTES
The ABCs of the Annual Wellness Visit  Subsequent Medicare Wellness Visit    Chief Complaint   Patient presents with   • Medicare Wellness-Initial Visit      Subjective    History of Present Illness:  Vanna Gil is a 66 y.o. female who presents for a Subsequent Medicare Wellness Visit.    She is also following up on depression and cervical spine radiculopathy.  I stopped her Zoloft last month and started her on amitriptyline 25 mg every evening to help with the numbness and tingling in her hands and arms which she states has helped.  She states that she feels like her depression is stable.    GERD: She complains of some nausea and vomiting occasionally.  She states she takes Tums as needed.    The following portions of the patient's history were reviewed and   updated as appropriate: allergies, current medications, past family history, past medical history, past social history, past surgical history and problem list.    Compared to one year ago, the patient feels her physical   health is better.    Compared to one year ago, the patient feels her mental   health is the same.    Recent Hospitalizations:  She was not admitted to the hospital during the last year.       Current Medical Providers:  Patient Care Team:  Ibeth Wilder APRN as PCP - General (Nurse Practitioner)    Outpatient Medications Prior to Visit   Medication Sig Dispense Refill   • atorvastatin (LIPITOR) 40 MG tablet Take 1 tablet by mouth Every Night. 90 tablet 3   • calcium carbonate (OS-AIMEE) 1250 (500 Ca) MG tablet Take 1 tablet by mouth 2 (two) times a day. 180 tablet 3   • carvedilol (COREG) 3.125 MG tablet Take 1 tablet by mouth 2 (two) times a day. 180 tablet 3   • cholecalciferol (Cholecalciferol) 25 MCG (1000 UT) tablet Take 2 tablets by mouth Every Morning. 180 tablet 3   • multivitamin with minerals tablet tablet Take 1 tablet by mouth Daily.     • NIFEdipine CC (ADALAT CC) 60 MG 24 hr tablet Take 1 tablet by mouth Daily. 90  tablet 3   • tolterodine LA (DETROL LA) 4 MG 24 hr capsule Take 1 capsule by mouth Daily. 90 capsule 3   • amitriptyline (ELAVIL) 25 MG tablet Take 1 tablet by mouth Every Night. 90 tablet 0   • loratadine (CLARITIN) 10 MG tablet Take 1 tablet by mouth Every Night. 90 tablet 3   • PEG-KCl-NaCl-NaSulf-Na Asc-C (MoviPrep) 100 g reconstituted solution powder Take 1,000 mL by mouth Every 12 (Twelve) Hours. 1 each 0   • hydrOXYzine (ATARAX) 25 MG tablet Take 25 mg by mouth 2 (Two) Times a Day As Needed for Itching.       No facility-administered medications prior to visit.       No opioid medication identified on active medication list. I have reviewed chart for other potential  high risk medication/s and harmful drug interactions in the elderly.          Aspirin is not on active medication list.  Aspirin use is not indicated based on review of current medical condition/s. Risk of harm outweighs potential benefits.  .    Patient Active Problem List   Diagnosis   • Elevated glucose level   • Gastroesophageal reflux disease   • Essential hypertension   • Depression   • Asthma   • Arthritis   • Anxiety   • Anemia   • Abdominal hernia   • Hearing loss   • Hyperlipidemia   • Hypertonicity of bladder   • Postmenopausal   • Eustachian tube dysfunction, left   • History of colon polyps   • Radiculopathy of cervical spine     Advance Care Planning  Advance Directive is not on file.  ACP discussion was held with the patient during this visit. Patient does not have an advance directive, declines further assistance.    Review of Systems   Constitutional: Negative for chills and fever.   HENT: Positive for hearing loss. Negative for ear discharge and ear pain.    Respiratory: Negative for cough, shortness of breath and wheezing.    Cardiovascular: Negative for chest pain and palpitations.   Gastrointestinal: Positive for nausea and vomiting. Negative for constipation and diarrhea.   Genitourinary: Negative for difficulty urinating,  "dysuria and frequency.   Musculoskeletal: Positive for back pain and neck pain.   Neurological: Negative for dizziness, weakness and confusion.   Psychiatric/Behavioral: Negative for suicidal ideas.        Objective    Vitals:    04/11/22 1052   BP: 118/72   Pulse: 75   Temp: 97.5 °F (36.4 °C)   SpO2: 97%   Weight: 86.6 kg (191 lb)   Height: 154.9 cm (61\")   PainSc: 0-No pain     BMI Readings from Last 1 Encounters:   04/11/22 36.09 kg/m²   BMI is above normal parameters. Recommendations include: educational material, exercise counseling and nutrition counseling    Does the patient have evidence of cognitive impairment? No    Physical Exam  Vitals reviewed.   Constitutional:       Appearance: Normal appearance. She is well-developed.   HENT:      Right Ear: Tympanic membrane, ear canal and external ear normal. Decreased hearing noted.      Left Ear: Tympanic membrane and ear canal normal. Decreased hearing noted.   Neck:      Thyroid: No thyroid mass, thyromegaly or thyroid tenderness.   Cardiovascular:      Rate and Rhythm: Normal rate and regular rhythm.      Heart sounds: No murmur heard.    No friction rub. No gallop.   Pulmonary:      Effort: Pulmonary effort is normal.      Breath sounds: Normal breath sounds. No wheezing or rhonchi.   Lymphadenopathy:      Cervical: No cervical adenopathy.   Skin:     General: Skin is warm and dry.   Neurological:      Mental Status: She is alert and oriented to person, place, and time.      Cranial Nerves: No cranial nerve deficit.   Psychiatric:         Mood and Affect: Mood and affect normal.         Behavior: Behavior normal.         Thought Content: Thought content normal. Thought content does not include homicidal or suicidal ideation.         Judgment: Judgment normal.       Lab Results   Component Value Date    TRIG 178 (H) 03/11/2022    HDL 53 03/11/2022     (H) 03/11/2022    VLDL 31 03/11/2022            HEALTH RISK ASSESSMENT    Smoking Status:  Social " History     Tobacco Use   Smoking Status Former Smoker   • Packs/day: 0.00   • Years: 30.00   • Pack years: 0.00   • Types: Cigarettes   • Start date: 1973   • Quit date: 2004   • Years since quittin.3   Smokeless Tobacco Never Used   Tobacco Comment    former smoker     Alcohol Consumption:  Social History     Substance and Sexual Activity   Alcohol Use Yes   • Alcohol/week: 1.0 standard drink   • Types: 1 Glasses of wine per week    Comment: currently some days; occasionally dinks, less than 1 drink per day     Fall Risk Screen:    ZENA Fall Risk Assessment was completed, and patient is at LOW risk for falls.Assessment completed on:2022    Depression Screening:  PHQ-2/PHQ-9 Depression Screening 2022   Retired PHQ-9 Total Score -   Retired Total Score -   Little Interest or Pleasure in Doing Things 0-->not at all   Feeling Down, Depressed or Hopeless 0-->not at all   PHQ-9: Brief Depression Severity Measure Score 0       Health Habits and Functional and Cognitive Screening:  Functional & Cognitive Status 2022   Do you have difficulty preparing food and eating? -   Do you have difficulty bathing yourself, getting dressed or grooming yourself? -   Do you have difficulty using the toilet? -   Do you have difficulty moving around from place to place? -   Do you have trouble with steps or getting out of a bed or a chair? -   Current Diet -   Dental Exam -   Eye Exam -   Exercise (times per week) -   Current Exercises Include -   Do you need help using the phone?  -   Are you deaf or do you have serious difficulty hearing?  -   Do you need help with transportation? -   Do you need help shopping? -   Do you need help preparing meals?  -   Do you need help with housework?  -   Do you need help with laundry? -   Do you need help taking your medications? -   Do you need help managing money? -   Do you ever drive or ride in a car without wearing a seat belt? -   Have you felt unusual stress,  anger or loneliness in the last month? -   Who do you live with? -   If you need help, do you have trouble finding someone available to you? -   Have you been bothered in the last four weeks by sexual problems? -   Do you have difficulty concentrating, remembering or making decisions? Yes       Age-appropriate Screening Schedule:  Refer to the list below for future screening recommendations based on patient's age, sex and/or medical conditions. Orders for these recommended tests are listed in the plan section. The patient has been provided with a written plan.    Health Maintenance   Topic Date Due   • ZOSTER VACCINE (1 of 2) Never done   • INFLUENZA VACCINE  08/01/2022   • DXA SCAN  09/24/2022   • LIPID PANEL  03/11/2023   • MAMMOGRAM  12/01/2023   • TDAP/TD VACCINES (2 - Tdap) 06/19/2030              Assessment/Plan   CMS Preventative Services Quick Reference  Risk Factors Identified During Encounter  Chronic Pain   Depression/Dysphoria  Hearing Problem  Immunizations Discussed/Encouraged (specific Immunizations; Shingrix  Obesity/Overweight   The above risks/problems have been discussed with the patient.  Follow up actions/plans if indicated are seen below in the Assessment/Plan Section.  Pertinent information has been shared with the patient in the After Visit Summary.    Diagnoses and all orders for this visit:    1. Encounter for subsequent annual wellness visit (AWV) in Medicare patient (Primary)    2. Gastroesophageal reflux disease with esophagitis without hemorrhage  Assessment & Plan:  I will start her on pantoprazole 40 mg daily for at least 2 weeks, then take as needed.  Advised to follow-up if not improving.      3. Current mild episode of major depressive disorder without prior episode (HCC)  Assessment & Plan:  Patient's depression is single episode and is mild without psychosis. Their depression is currently active and the condition is improving with treatment. This will be reassessed at the next  regular appointment. F/U as described:patient will continue current medication therapy.      4. Radiculopathy of cervical spine  Assessment & Plan:  She states that the numbness in her hands and arms have improved since starting amitriptyline, will continue current dose.      Other orders  -     pantoprazole (PROTONIX) 40 MG EC tablet; Take 1 tablet by mouth Daily.  Dispense: 90 tablet; Refill: 0  -     amitriptyline (ELAVIL) 25 MG tablet; Take 1 tablet by mouth Every Night.  Dispense: 90 tablet; Refill: 0  -     loratadine (CLARITIN) 10 MG tablet; Take 1 tablet by mouth Every Night.  Dispense: 90 tablet; Refill: 2      Follow Up:   Return in about 6 months (around 10/11/2022) for Next scheduled follow up.     An After Visit Summary and PPPS were made available to the patient.

## 2022-04-11 NOTE — PATIENT INSTRUCTIONS
"Healthy Eating  Following a healthy eating pattern may help you to achieve and maintain a healthy body weight, reduce the risk of chronic disease, and live a long and productive life. It is important to follow a healthy eating pattern at an appropriate calorie level for your body. Your nutritional needs should be met primarily through food by choosing a variety of nutrient-rich foods.  What are tips for following this plan?  Reading food labels  Read labels and choose the following:  Reduced or low sodium.  Juices with 100% fruit juice.  Foods with low saturated fats and high polyunsaturated and monounsaturated fats.  Foods with whole grains, such as whole wheat, cracked wheat, brown rice, and wild rice.  Whole grains that are fortified with folic acid. This is recommended for women who are pregnant or who want to become pregnant.  Read labels and avoid the following:  Foods with a lot of added sugars. These include foods that contain brown sugar, corn sweetener, corn syrup, dextrose, fructose, glucose, high-fructose corn syrup, honey, invert sugar, lactose, malt syrup, maltose, molasses, raw sugar, sucrose, trehalose, or turbinado sugar.  Do not eat more than the following amounts of added sugar per day:  6 teaspoons (25 g) for women.  9 teaspoons (38 g) for men.  Foods that contain processed or refined starches and grains.  Refined grain products, such as white flour, degermed cornmeal, white bread, and white rice.  Shopping  Choose nutrient-rich snacks, such as vegetables, whole fruits, and nuts. Avoid high-calorie and high-sugar snacks, such as potato chips, fruit snacks, and candy.  Use oil-based dressings and spreads on foods instead of solid fats such as butter, stick margarine, or cream cheese.  Limit pre-made sauces, mixes, and \"instant\" products such as flavored rice, instant noodles, and ready-made pasta.  Try more plant-protein sources, such as tofu, tempeh, black beans, edamame, lentils, nuts, and " seeds.  Explore eating plans such as the Mediterranean diet or vegetarian diet.  Cooking  Use oil to sauté or stir-peng foods instead of solid fats such as butter, stick margarine, or lard.  Try baking, boiling, grilling, or broiling instead of frying.  Remove the fatty part of meats before cooking.  Steam vegetables in water or broth.  Meal planning    At meals, imagine dividing your plate into fourths:  One-half of your plate is fruits and vegetables.  One-fourth of your plate is whole grains.  One-fourth of your plate is protein, especially lean meats, poultry, eggs, tofu, beans, or nuts.  Include low-fat dairy as part of your daily diet.    Lifestyle  Choose healthy options in all settings, including home, work, school, restaurants, or stores.  Prepare your food safely:  Wash your hands after handling raw meats.  Keep food preparation surfaces clean by regularly washing with hot, soapy water.  Keep raw meats separate from ready-to-eat foods, such as fruits and vegetables.  , meat, poultry, and eggs to the recommended internal temperature.  Store foods at safe temperatures. In general:  Keep cold foods at 40°F (4.4°C) or below.  Keep hot foods at 140°F (60°C) or above.  Keep your freezer at 0°F (-17.8°C) or below.  Foods are no longer safe to eat when they have been between the temperatures of 40°-140°F (4.4-60°C) for more than 2 hours.  What foods should I eat?  Fruits  Aim to eat 2 cup-equivalents of fresh, canned (in natural juice), or frozen fruits each day. Examples of 1 cup-equivalent of fruit include 1 small apple, 8 large strawberries, 1 cup canned fruit, ½ cup dried fruit, or 1 cup 100% juice.  Vegetables  Aim to eat 2½-3 cup-equivalents of fresh and frozen vegetables each day, including different varieties and colors. Examples of 1 cup-equivalent of vegetables include 2 medium carrots, 2 cups raw, leafy greens, 1 cup chopped vegetable (raw or cooked), or 1 medium baked potato.  Grains  Aim to  eat 6 ounce-equivalents of whole grains each day. Examples of 1 ounce-equivalent of grains include 1 slice of bread, 1 cup ready-to-eat cereal, 3 cups popcorn, or ½ cup cooked rice, pasta, or cereal.  Meats and other proteins  Aim to eat 5-6 ounce-equivalents of protein each day. Examples of 1 ounce-equivalent of protein include 1 egg, 1/2 cup nuts or seeds, or 1 tablespoon (16 g) peanut butter. A cut of meat or fish that is the size of a deck of cards is about 3-4 ounce-equivalents.  Of the protein you eat each week, try to have at least 8 ounces come from seafood. This includes salmon, trout, herring, and anchovies.  Dairy  Aim to eat 3 cup-equivalents of fat-free or low-fat dairy each day. Examples of 1 cup-equivalent of dairy include 1 cup (240 mL) milk, 8 ounces (250 g) yogurt, 1½ ounces (44 g) natural cheese, or 1 cup (240 mL) fortified soy milk.  Fats and oils  Aim for about 5 teaspoons (21 g) per day. Choose monounsaturated fats, such as canola and olive oils, avocados, peanut butter, and most nuts, or polyunsaturated fats, such as sunflower, corn, and soybean oils, walnuts, pine nuts, sesame seeds, sunflower seeds, and flaxseed.  Beverages  Aim for six 8-oz glasses of water per day. Limit coffee to three to five 8-oz cups per day.  Limit caffeinated beverages that have added calories, such as soda and energy drinks.  Limit alcohol intake to no more than 1 drink a day for nonpregnant women and 2 drinks a day for men. One drink equals 12 oz of beer (355 mL), 5 oz of wine (148 mL), or 1½ oz of hard liquor (44 mL).  Seasoning and other foods  Avoid adding excess amounts of salt to your foods. Try flavoring foods with herbs and spices instead of salt.  Avoid adding sugar to foods.  Try using oil-based dressings, sauces, and spreads instead of solid fats.  This information is based on general [place] nutrition guidelines. For more information, visit choosemyplate.gov. Exact amounts may vary based on your nutrition  needs.  Summary  A healthy eating plan may help you to maintain a healthy weight, reduce the risk of chronic diseases, and stay active throughout your life.  Plan your meals. Make sure you eat the right portions of a variety of nutrient-rich foods.  Try baking, boiling, grilling, or broiling instead of frying.  Choose healthy options in all settings, including home, work, school, restaurants, or stores.  This information is not intended to replace advice given to you by your health care provider. Make sure you discuss any questions you have with your health care provider.  Document Revised: 04/01/2019 Document Reviewed: 04/01/2019  Elsevier Patient Education © 2021 [employer]

## 2022-04-11 NOTE — ASSESSMENT & PLAN NOTE
She states that the numbness in her hands and arms have improved since starting amitriptyline, will continue current dose.

## 2022-04-11 NOTE — ASSESSMENT & PLAN NOTE
I will start her on pantoprazole 40 mg daily for at least 2 weeks, then take as needed.  Advised to follow-up if not improving.

## 2022-04-14 ENCOUNTER — TELEPHONE (OUTPATIENT)
Dept: FAMILY MEDICINE CLINIC | Facility: CLINIC | Age: 67
End: 2022-04-14

## 2022-04-14 NOTE — TELEPHONE ENCOUNTER
Pt is leaving may and I will be gone a couple months and would like to have  More qty on her  Pantoprazole

## 2022-04-15 ENCOUNTER — TELEPHONE (OUTPATIENT)
Dept: FAMILY MEDICINE CLINIC | Facility: CLINIC | Age: 67
End: 2022-04-15

## 2022-04-15 NOTE — TELEPHONE ENCOUNTER
PATIENT IS GOING TO BE GOING OUT OF TOWN IN THE MIDDLE OF MAY, NOT SURE WHEN SHE WILL BE BACK, GOING TO [place] TO SEE DAUGHTER. AND NEEDING TO KNOW IF THE 90 DAY SUPPLY THAT WAS SENT IN WILL LAST OR IF THERE IS A REFILL THAT CAN BE CALLED IN FOR HER.     COULD NOT REMEMBER THE NAME OF MEDS BELIEVES IT STARTED WITH A P.      CALL AND ADVISE [phone]

## 2022-04-18 RX ORDER — PANTOPRAZOLE SODIUM 40 MG/1
40 TABLET, DELAYED RELEASE ORAL DAILY
Qty: 90 TABLET | Refills: 1 | Status: SHIPPED | OUTPATIENT
Start: 2022-04-18 | End: 2022-05-11 | Stop reason: SDUPTHER

## 2022-05-11 ENCOUNTER — OFFICE VISIT (OUTPATIENT)
Dept: FAMILY MEDICINE CLINIC | Facility: CLINIC | Age: 67
End: 2022-05-11

## 2022-05-11 VITALS
BODY MASS INDEX: 35.5 KG/M2 | HEART RATE: 86 BPM | WEIGHT: 188 LBS | TEMPERATURE: 98.4 F | OXYGEN SATURATION: 98 % | SYSTOLIC BLOOD PRESSURE: 130 MMHG | HEIGHT: 61 IN | DIASTOLIC BLOOD PRESSURE: 84 MMHG

## 2022-05-11 DIAGNOSIS — K21.9 GASTROESOPHAGEAL REFLUX DISEASE, UNSPECIFIED WHETHER ESOPHAGITIS PRESENT: ICD-10-CM

## 2022-05-11 DIAGNOSIS — Z76.89 ENCOUNTER TO ESTABLISH CARE: Primary | ICD-10-CM

## 2022-05-11 PROCEDURE — 99214 OFFICE O/P EST MOD 30 MIN: CPT | Performed by: NURSE PRACTITIONER

## 2022-05-11 RX ORDER — PANTOPRAZOLE SODIUM 40 MG/1
40 TABLET, DELAYED RELEASE ORAL DAILY
Qty: 90 TABLET | Refills: 1 | Status: SHIPPED | OUTPATIENT
Start: 2022-05-11 | End: 2022-07-22 | Stop reason: SDUPTHER

## 2022-05-11 NOTE — PROGRESS NOTES
Chief Complaint  Establish Care (PCP transfer of care, Rx refill request Pantoprazole 40mg)    Subjective          Medical History: has a past medical history of Acid reflux disease, Anemia, Anxiety, Arthritis, Asthma, Bladder disorder, Colon polyp (9-), Depression (01/14/2020), Essential hypertension (02/14/2020), H/O hernia repair, Hearing loss, Hemorrhoids, Hyperlipemia, Hyperlipidemia, OAB (overactive bladder) (02/14/2020), Obesity (?), Pneumonia (4-5-2014), Shortness of breath, and Visual impairment (?).     Surgical History: has a past surgical history that includes Appendectomy; Joint replacement; Back surgery; Breast biopsy (Left); Esophagogastroduodenoscopy (2018); Hernia repair; knee arthroplasty, partial replacement; Spine surgery; Tonsillectomy; Fracture surgery (12-); and Colonoscopy (09/20/2018).     Family History: family history includes Breast cancer in her mother; Cancer in her mother; Early death in her brother; Hearing loss in her father; Heart disease in her brother, brother, and father; Hyperlipidemia in her father; Other in her mother; Vision loss in her brother, father, and mother.     Social History: reports that she quit smoking about 17 years ago. Her smoking use included cigarettes. She started smoking about 49 years ago. She smoked 0.00 packs per day for 30.00 years. She has never used smokeless tobacco. She reports current alcohol use of about 1.0 standard drink of alcohol per week. She reports previous drug use. Drug: Marijuana.    Vanna Gil presents to [facility]  Patient is a 66-year-old female who [facility]  She is a former smoker.  She has colorectal screening scheduled for August 19, 2022.  Last DEXA scan was September 2020.  Last mammogram was December 2021.  She received her pneumococcal vaccine in July 2020.  She is up-to-date on her annual wellness exam, it was completed April  "11, 2022.  Last hemoglobin A1c was checked June 2021 it was 5.5.  Patient has a history of right knee replacement.  She is currently on medication for hyperlipidemia, vitamin D deficiency, seasonal allergies, hypertension, and GERD.  She is stable on these medications.  Patient last seen Ibeth 4/11/2022 at that time she was started on Protonix for her GERD.  She comes in today for follow-up and to establish care.    Patient states Protonix is working well for her and she would like a refill.  She does plan to go to [place] for the summer to visit [facility].    Heartburn  She complains of early satiety and heartburn. This is a recurrent problem. The current episode started more than 1 month ago. The problem occurs frequently. The problem has been waxing and waning. The heartburn is located in the substernum. The symptoms are aggravated by certain foods. Risk factors include obesity. She has tried a PPI for the symptoms. The treatment provided significant relief.       Objective   Vital Signs:   /84 (BP Location: Left arm, Patient Position: Sitting, Cuff Size: Adult)   Pulse 86   Temp 98.4 °F (36.9 °C) (Oral)   Ht 154.9 cm (61\")   Wt 85.3 kg (188 lb)   SpO2 98%   BMI 35.52 kg/m²     Physical Exam  Vitals reviewed.   Constitutional:       Appearance: Normal appearance. She is well-developed.   HENT:      Head: Normocephalic and atraumatic.      Right Ear: External ear normal.      Left Ear: External ear normal.   Eyes:      Conjunctiva/sclera: Conjunctivae normal.      Pupils: Pupils are equal, round, and reactive to light.   Cardiovascular:      Rate and Rhythm: Normal rate and regular rhythm.      Heart sounds: No murmur heard.    No friction rub.   Pulmonary:      Effort: Pulmonary effort is normal.      Breath sounds: Normal breath sounds. No wheezing or rhonchi.   Skin:     General: Skin is warm and dry.   Neurological:      Mental Status: She is alert and oriented to person, place, and time. "   Psychiatric:         Mood and Affect: Mood and affect normal.         Behavior: Behavior normal.         Thought Content: Thought content normal.         Judgment: Judgment normal.        Result Review :   The following data was reviewed by: LATOSHA Watters on 05/11/2022:  Common labs    Common Labsle 6/7/21 9/10/21 9/10/21 9/10/21 3/11/22 3/11/22     1440 1440 1440 1512 1512   Glucose   86   87   BUN   17   19   Creatinine   1.11 (A)   0.75   eGFR Non African Am   49 (A)      Sodium   136   141   Potassium   4.3   4.0   Chloride   100   103   Calcium   10.2   10.3   Albumin   4.70   4.70   Total Bilirubin   0.3   0.4   Alkaline Phosphatase   101   104   AST (SGOT)   27   25   ALT (SGPT)   26   20   WBC  6.07       Hemoglobin  14.3       Hematocrit  42.6       Platelets  252       Total Cholesterol    196 190    Triglycerides    206 (A) 178 (A)    HDL Cholesterol    55 53    LDL Cholesterol     106 (A) 106 (A)    Hemoglobin A1C 5.53        (A) Abnormal value                        Current Outpatient Medications on File Prior to Visit   Medication Sig Dispense Refill   • amitriptyline (ELAVIL) 25 MG tablet Take 1 tablet by mouth Every Night. 90 tablet 0   • atorvastatin (LIPITOR) 40 MG tablet Take 1 tablet by mouth Every Night. 90 tablet 3   • calcium carbonate (OS-AIMEE) 1250 (500 Ca) MG tablet Take 1 tablet by mouth 2 (two) times a day. 180 tablet 3   • carvedilol (COREG) 3.125 MG tablet Take 1 tablet by mouth 2 (two) times a day. 180 tablet 3   • cholecalciferol (Cholecalciferol) 25 MCG (1000 UT) tablet Take 2 tablets by mouth Every Morning. 180 tablet 3   • loratadine (CLARITIN) 10 MG tablet Take 1 tablet by mouth Every Night. 90 tablet 2   • multivitamin with minerals tablet tablet Take 1 tablet by mouth Daily.     • NIFEdipine CC (ADALAT CC) 60 MG 24 hr tablet Take 1 tablet by mouth Daily. 90 tablet 3   • PEG-KCl-NaCl-NaSulf-Na Asc-C (MoviPrep) 100 g reconstituted solution powder Take 1,000 mL by  mouth Every 12 (Twelve) Hours. 1 each 0   • tolterodine LA (DETROL LA) 4 MG 24 hr capsule Take 1 capsule by mouth Daily. 90 capsule 3     No current facility-administered medications on file prior to visit.        Assessment and Plan    Diagnoses and all orders for this visit:    1. Encounter to establish care (Primary)    2. Gastroesophageal reflux disease, unspecified whether esophagitis present  Comments:  We will refill the Protonix.  Patient will follow-up when she returns home from [place].    Other orders  -     pantoprazole (PROTONIX) 40 MG EC tablet; Take 1 tablet by mouth Daily.  Dispense: 90 tablet; Refill: 1        Follow Up   No follow-ups on file.  Patient was given instructions and counseling regarding her condition or for health maintenance advice. Please see specific information pulled into the AVS if appropriate.

## 2022-06-29 ENCOUNTER — OFFICE VISIT (OUTPATIENT)
Dept: FAMILY MEDICINE CLINIC | Facility: CLINIC | Age: 67
End: 2022-06-29

## 2022-06-29 VITALS
HEIGHT: 61 IN | HEART RATE: 67 BPM | SYSTOLIC BLOOD PRESSURE: 138 MMHG | OXYGEN SATURATION: 98 % | BODY MASS INDEX: 35.68 KG/M2 | WEIGHT: 189 LBS | TEMPERATURE: 98.1 F | DIASTOLIC BLOOD PRESSURE: 82 MMHG

## 2022-06-29 DIAGNOSIS — F32.0 CURRENT MILD EPISODE OF MAJOR DEPRESSIVE DISORDER WITHOUT PRIOR EPISODE: ICD-10-CM

## 2022-06-29 DIAGNOSIS — I10 PRIMARY HYPERTENSION: ICD-10-CM

## 2022-06-29 DIAGNOSIS — F07.81 POST CONCUSSION SYNDROME: Primary | ICD-10-CM

## 2022-06-29 LAB
ALBUMIN SERPL-MCNC: 4.3 G/DL (ref 3.5–5.2)
ALBUMIN/GLOB SERPL: 1.4 G/DL
ALP SERPL-CCNC: 99 U/L (ref 39–117)
ALT SERPL W P-5'-P-CCNC: 15 U/L (ref 1–33)
ANION GAP SERPL CALCULATED.3IONS-SCNC: 10.9 MMOL/L (ref 5–15)
AST SERPL-CCNC: 21 U/L (ref 1–32)
BASOPHILS # BLD AUTO: 0.06 10*3/MM3 (ref 0–0.2)
BASOPHILS NFR BLD AUTO: 1.3 % (ref 0–1.5)
BILIRUB SERPL-MCNC: 0.4 MG/DL (ref 0–1.2)
BUN SERPL-MCNC: 15 MG/DL (ref 8–23)
BUN/CREAT SERPL: 20.8 (ref 7–25)
CALCIUM SPEC-SCNC: 9.6 MG/DL (ref 8.6–10.5)
CHLORIDE SERPL-SCNC: 104 MMOL/L (ref 98–107)
CO2 SERPL-SCNC: 27.1 MMOL/L (ref 22–29)
CREAT SERPL-MCNC: 0.72 MG/DL (ref 0.57–1)
DEPRECATED RDW RBC AUTO: 43.4 FL (ref 37–54)
EGFRCR SERPLBLD CKD-EPI 2021: 91.8 ML/MIN/1.73
EOSINOPHIL # BLD AUTO: 0.37 10*3/MM3 (ref 0–0.4)
EOSINOPHIL NFR BLD AUTO: 7.8 % (ref 0.3–6.2)
ERYTHROCYTE [DISTWIDTH] IN BLOOD BY AUTOMATED COUNT: 12.8 % (ref 12.3–15.4)
GLOBULIN UR ELPH-MCNC: 3.1 GM/DL
GLUCOSE SERPL-MCNC: 89 MG/DL (ref 65–99)
HCT VFR BLD AUTO: 40.7 % (ref 34–46.6)
HGB BLD-MCNC: 13.6 G/DL (ref 12–15.9)
IMM GRANULOCYTES # BLD AUTO: 0.01 10*3/MM3 (ref 0–0.05)
IMM GRANULOCYTES NFR BLD AUTO: 0.2 % (ref 0–0.5)
LYMPHOCYTES # BLD AUTO: 1.53 10*3/MM3 (ref 0.7–3.1)
LYMPHOCYTES NFR BLD AUTO: 32.3 % (ref 19.6–45.3)
MCH RBC QN AUTO: 31.1 PG (ref 26.6–33)
MCHC RBC AUTO-ENTMCNC: 33.4 G/DL (ref 31.5–35.7)
MCV RBC AUTO: 92.9 FL (ref 79–97)
MONOCYTES # BLD AUTO: 0.47 10*3/MM3 (ref 0.1–0.9)
MONOCYTES NFR BLD AUTO: 9.9 % (ref 5–12)
NEUTROPHILS NFR BLD AUTO: 2.3 10*3/MM3 (ref 1.7–7)
NEUTROPHILS NFR BLD AUTO: 48.5 % (ref 42.7–76)
NRBC BLD AUTO-RTO: 0 /100 WBC (ref 0–0.2)
PLATELET # BLD AUTO: 265 10*3/MM3 (ref 140–450)
PMV BLD AUTO: 10.8 FL (ref 6–12)
POTASSIUM SERPL-SCNC: 4 MMOL/L (ref 3.5–5.2)
PROT SERPL-MCNC: 7.4 G/DL (ref 6–8.5)
RBC # BLD AUTO: 4.38 10*6/MM3 (ref 3.77–5.28)
SODIUM SERPL-SCNC: 142 MMOL/L (ref 136–145)
WBC NRBC COR # BLD: 4.74 10*3/MM3 (ref 3.4–10.8)

## 2022-06-29 PROCEDURE — 80053 COMPREHEN METABOLIC PANEL: CPT | Performed by: NURSE PRACTITIONER

## 2022-06-29 PROCEDURE — 85025 COMPLETE CBC W/AUTO DIFF WBC: CPT | Performed by: NURSE PRACTITIONER

## 2022-06-29 PROCEDURE — 99213 OFFICE O/P EST LOW 20 MIN: CPT | Performed by: NURSE PRACTITIONER

## 2022-06-29 RX ORDER — AMITRIPTYLINE HYDROCHLORIDE 25 MG/1
12 TABLET, FILM COATED ORAL NIGHTLY
Qty: 90 TABLET | Refills: 0 | Status: SHIPPED | OUTPATIENT
Start: 2022-06-29 | End: 2022-11-16 | Stop reason: SDUPTHER

## 2022-06-29 NOTE — PROGRESS NOTES
"Chief Complaint  Fall (Pt reports falling down when visiting daughter in [place], occurred 05/24/2022. Pt was seen at [facility], has provided discharge forms.), Eyelid Concern (Left top eyelid - two small bumps), and Pain (Left side of head radiating across \"like a headache - stabbing pain\" and unbalanced.)    Subjective        Vanna Gil presents to [facility]  History of Present Illness  Presents today for an acute visit for dizziness and headache x1 month.  Patient was in [place] and was at her daughter's house.  Patient reports when she had opened the door and was walking through the door in her home there is a dog at her feet.  Patient states she felt off balance lightheaded.  She does not recall falling but then waking up on the floor noticing that she had hit her head.  She was taken to the emergency department at [facility] in [facility].  She states that they scanned her head and neck.    Since the fall she reports that she will have occasional dizziness with movement.  Denies vertigo.  She reports having occasional pain in the front part of her head on the right and left side that is sharp but only lasts a few seconds to a minute and then goes away.  Denies change in vision.    Objective   Vital Signs:  /82 (BP Location: Left arm, Patient Position: Sitting, Cuff Size: Adult)   Pulse 67   Temp 98.1 °F (36.7 °C) (Oral)   Ht 154.9 cm (61\")   Wt 85.7 kg (189 lb)   SpO2 98%   BMI 35.71 kg/m²   Estimated body mass index is 35.71 kg/m² as calculated from the following:    Height as of this encounter: 154.9 cm (61\").    Weight as of this encounter: 85.7 kg (189 lb).    Vitals:    06/29/22 0830 06/29/22 0925 06/29/22 0927 06/29/22 0931   Orthostatic BP:  152/90 158/88 158/100   Patient Position: Sitting Lying Sitting Standing             Physical Exam  Vitals and nursing note reviewed.   Constitutional:       General: She is not " in acute distress.     Appearance: Normal appearance. She is well-developed and well-groomed. She is not ill-appearing.   HENT:      Head: Normocephalic and atraumatic.      Right Ear: Ear canal and external ear normal.      Left Ear: External ear normal.      Nose: Nose normal. No congestion or rhinorrhea.      Mouth/Throat:      Mouth: Mucous membranes are moist.   Eyes:      Extraocular Movements: Extraocular movements intact.      Pupils: Pupils are equal, round, and reactive to light.   Cardiovascular:      Rate and Rhythm: Normal rate and regular rhythm.      Pulses: Normal pulses.      Heart sounds: Normal heart sounds. No murmur heard.    No friction rub. No gallop.   Pulmonary:      Effort: Pulmonary effort is normal. No respiratory distress.      Breath sounds: Normal breath sounds. No wheezing, rhonchi or rales.   Musculoskeletal:         General: No swelling or tenderness. Normal range of motion.      Cervical back: Normal range of motion and neck supple. No rigidity or tenderness.   Skin:     General: Skin is warm.      Capillary Refill: Capillary refill takes less than 2 seconds.      Findings: No rash.   Neurological:      General: No focal deficit present.      Mental Status: She is alert and oriented to person, place, and time.      Cranial Nerves: No cranial nerve deficit.      Sensory: No sensory deficit.      Motor: No weakness.      Gait: Gait normal.      Deep Tendon Reflexes: Reflexes normal.   Psychiatric:         Mood and Affect: Mood normal.         Behavior: Behavior normal.         Thought Content: Thought content normal.         Judgment: Judgment normal.        Result Review :                Assessment and Plan   Diagnoses and all orders for this visit:    1. Post concussion syndrome (Primary)    2. Primary hypertension  -     CBC w AUTO Differential  -     Comprehensive metabolic panel    3. Current mild episode of major depressive disorder without prior episode (HCC)    Other  orders  -     amitriptyline (ELAVIL) 25 MG tablet; Take 0.5 tablets by mouth Every Night.  Dispense: 90 tablet; Refill: 0    Will request ER note and CT scan from [place].  Patient most likely has postconcussion syndrome.  Discussed taking Tylenol ibuprofen as needed for the intermittent headaches.  Blood pressure is elevated today.  Patient states she has not taken any her blood pressure medications.  Instructed to monitor blood pressure at home and to follow-up with Julianna in 1 month.  With dizziness we will decrease her amitriptyline to half a tablet.  She reports he takes medication for depression and help her sleep at night.         Follow Up   Return in about 4 weeks (around 7/27/2022), or if symptoms worsen or fail to improve, for Next scheduled follow up.  Patient was given instructions and counseling regarding her condition or for health maintenance advice. Please see specific information pulled into the AVS if appropriate.

## 2022-07-22 RX ORDER — PANTOPRAZOLE SODIUM 40 MG/1
40 TABLET, DELAYED RELEASE ORAL DAILY
Qty: 90 TABLET | Refills: 1 | Status: SHIPPED | OUTPATIENT
Start: 2022-07-22 | End: 2023-01-25 | Stop reason: SDUPTHER

## 2022-07-22 NOTE — TELEPHONE ENCOUNTER
Caller: Vanna Gil    Relationship: Self    Best call back number: [phone]     Requested Prescriptions:   Requested Prescriptions     Pending Prescriptions Disp Refills   • pantoprazole (PROTONIX) 40 MG EC tablet 90 tablet 1     Sig: Take 1 tablet by mouth Daily.        Pharmacy where request should be sent: [facility] [place]  [address] - [phone]  - [phone] FX     Additional details provided by patient: ONLY HAS 2 PILLS LEFT    PLEASE CALL AND ADVISE WHEN THIS HAS BEEN CALLED IN    Does the patient have less than a 3 day supply:  [x] Yes  [] No    Radha CASTILLO Rep   07/22/22 09:02 EDT

## 2022-08-05 ENCOUNTER — OFFICE VISIT (OUTPATIENT)
Dept: FAMILY MEDICINE CLINIC | Facility: CLINIC | Age: 67
End: 2022-08-05

## 2022-08-05 VITALS
OXYGEN SATURATION: 96 % | HEART RATE: 65 BPM | SYSTOLIC BLOOD PRESSURE: 142 MMHG | WEIGHT: 187 LBS | TEMPERATURE: 96.7 F | HEIGHT: 61 IN | BODY MASS INDEX: 35.3 KG/M2 | DIASTOLIC BLOOD PRESSURE: 90 MMHG

## 2022-08-05 DIAGNOSIS — Z09 FOLLOW-UP EXAM: Primary | ICD-10-CM

## 2022-08-05 DIAGNOSIS — M85.859 OSTEOPENIA OF NECK OF FEMUR, UNSPECIFIED LATERALITY: ICD-10-CM

## 2022-08-05 DIAGNOSIS — I10 ESSENTIAL HYPERTENSION: ICD-10-CM

## 2022-08-05 DIAGNOSIS — G89.29 CHRONIC MIDLINE LOW BACK PAIN WITHOUT SCIATICA: ICD-10-CM

## 2022-08-05 DIAGNOSIS — M54.50 CHRONIC MIDLINE LOW BACK PAIN WITHOUT SCIATICA: ICD-10-CM

## 2022-08-05 DIAGNOSIS — H91.93 BILATERAL HEARING LOSS, UNSPECIFIED HEARING LOSS TYPE: ICD-10-CM

## 2022-08-05 DIAGNOSIS — M81.0 AGE-RELATED OSTEOPOROSIS WITHOUT CURRENT PATHOLOGICAL FRACTURE: ICD-10-CM

## 2022-08-05 DIAGNOSIS — F07.81 POSTCONCUSSIVE SYNDROME: ICD-10-CM

## 2022-08-05 PROCEDURE — 99214 OFFICE O/P EST MOD 30 MIN: CPT | Performed by: NURSE PRACTITIONER

## 2022-08-05 NOTE — PROGRESS NOTES
Chief Complaint  Hypertension and post concussion    Subjective          Medical History: has a past medical history of Acid reflux disease, Anemia, Anxiety, Arthritis, Asthma, Bladder disorder, Colon polyp (9-), Depression (01/14/2020), Essential hypertension (02/14/2020), H/O hernia repair, Hearing loss, Hemorrhoids, Hyperlipemia, Hyperlipidemia, OAB (overactive bladder) (02/14/2020), Obesity (?), Pneumonia (4-5-2014), Shortness of breath, and Visual impairment (?).     Surgical History: has a past surgical history that includes Appendectomy; Joint replacement; Back surgery; Breast biopsy (Left); Esophagogastroduodenoscopy (2018); Hernia repair; knee arthroplasty, partial replacement; Spine surgery; Tonsillectomy; Fracture surgery (12-); and Colonoscopy (09/20/2018).     Family History: family history includes Breast cancer in her mother; Cancer in her mother; Early death in her brother; Hearing loss in her father; Heart disease in her brother, brother, and father; Hyperlipidemia in her father; Other in her mother; Vision loss in her brother, father, and mother.     Social History: reports that she quit smoking about 17 years ago. Her smoking use included cigarettes. She started smoking about 49 years ago. She has a 30.00 pack-year smoking history. She has never used smokeless tobacco. She reports current alcohol use of about 1.0 standard drink of alcohol per week. She reports previous drug use. Drug: Marijuana.    Vanna Gil presents to [facility]  Patient is a 67-year-old female who comes in today for 1 month follow-up.  Patient seen [facility] nurse practitioner 6/29/2022.  She had fallen hitting her head and was diagnosed with a concussion.  Patient was seen in [place], and we did attempt to request the ER note and CT scan from [place].  Per patient the scan was normal.  Patient is still complaining of a small tender area over the left outer orbit.  CTs from  "neck, facial bones, and had were completed in the ER and I have reviewed them in patient's chart.  There is no acute bony abnormalities in any of the x-rays or CTs..    Patient is also complaining of low back pain.  She states she did have a fracture of her back from 2016.  Patient states that the pain is worse moving side to side in certain positions.  Pain waxes and wanes in intensity.  She denies any paresthesias of lower extremities, no weakness in her legs, no bowel or bladder incontinence patient states when she fell after a couple days she did notice a bruise on her lower back, was not noticed at the time she went to the emergency room as no imaging in the back was done.    Patient also comes in today for post follow-up of hypertension.  She is currently on FPM for her blood pressure.  Patient did bring a log of her blood pressures showing her blood pressure staying within normal limits.  It is elevated today at 142/90, she denies any headache, chest pain or change in vision.  She states that she was late for her appointment and she was rushing to get in here, and she feels like that was what is causing her blood pressure to be up.  We will have patient wait a few moments and recheck blood pressure before she leaves.    Check blood pressure is now 112/78.    Patient has a history of decreased hearing.  She states is worse in her left ear than right.  She has hearing aids that she is needing some help with the fitting.  We will refer her over to audiology without assistance.  Patient is agreeable.    Patient has a history of osteopenia femoral neck.  She is due for her DEXA scan.  She does take calcium on a daily basis.  She would like to order her DEXA scan at today's visit.      Objective   Vital Signs:   /90   Pulse 65   Temp 96.7 °F (35.9 °C)   Ht 154.9 cm (61\")   Wt 84.8 kg (187 lb)   SpO2 96%   BMI 35.33 kg/m²     Physical Exam  Vitals reviewed.   Constitutional:       Appearance: Normal " appearance. She is well-developed.   HENT:      Head: Normocephalic and atraumatic.   Eyes:      Conjunctiva/sclera: Conjunctivae normal.      Pupils: Pupils are equal, round, and reactive to light.   Cardiovascular:      Rate and Rhythm: Normal rate and regular rhythm.      Heart sounds: No murmur heard.  Pulmonary:      Effort: Pulmonary effort is normal.      Breath sounds: Normal breath sounds. No wheezing or rhonchi.   Musculoskeletal:      Lumbar back: Tenderness and bony tenderness present. No swelling, edema or deformity. Negative right straight leg raise test and negative left straight leg raise test.   Skin:     General: Skin is warm and dry.   Neurological:      Mental Status: She is alert and oriented to person, place, and time.   Psychiatric:         Mood and Affect: Mood and affect normal.         Behavior: Behavior normal.         Thought Content: Thought content normal.         Judgment: Judgment normal.        Result Review :   The following data was reviewed by: LATOSHA Watters on 08/05/2022:  Common labs    Common Labsle 9/10/21 9/10/21 9/10/21 3/11/22 3/11/22 6/29/22 6/29/22    1440 1440 1440 1512 1512 0956 0956   Glucose  86   87  89   BUN  17   19  15   Creatinine  1.11 (A)   0.75  0.72   eGFR Non African Am  49 (A)        Sodium  136   141  142   Potassium  4.3   4.0  4.0   Chloride  100   103  104   Calcium  10.2   10.3  9.6   Albumin  4.70   4.70  4.30   Total Bilirubin  0.3   0.4  0.4   Alkaline Phosphatase  101   104  99   AST (SGOT)  27   25  21   ALT (SGPT)  26   20  15   WBC 6.07     4.74    Hemoglobin 14.3     13.6    Hematocrit 42.6     40.7    Platelets 252     265    Total Cholesterol   196 190      Triglycerides   206 (A) 178 (A)      HDL Cholesterol   55 53      LDL Cholesterol    106 (A) 106 (A)      (A) Abnormal value            Data reviewed: Previous [facility] note, CT head, x-ray of neck and facial bones            Current Outpatient Medications on File Prior to  Visit   Medication Sig Dispense Refill   • amitriptyline (ELAVIL) 25 MG tablet Take 0.5 tablets by mouth Every Night. 90 tablet 0   • atorvastatin (LIPITOR) 40 MG tablet Take 1 tablet by mouth Every Night. 90 tablet 3   • calcium carbonate (OS-AIMEE) 1250 (500 Ca) MG tablet Take 1 tablet by mouth 2 (two) times a day. 180 tablet 3   • carvedilol (COREG) 3.125 MG tablet Take 1 tablet by mouth 2 (two) times a day. 180 tablet 3   • cholecalciferol (Cholecalciferol) 25 MCG (1000 UT) tablet Take 2 tablets by mouth Every Morning. 180 tablet 3   • loratadine (CLARITIN) 10 MG tablet Take 1 tablet by mouth Every Night. 90 tablet 2   • multivitamin with minerals tablet tablet Take 1 tablet by mouth Daily.     • NIFEdipine CC (ADALAT CC) 60 MG 24 hr tablet Take 1 tablet by mouth Daily. 90 tablet 3   • pantoprazole (PROTONIX) 40 MG EC tablet Take 1 tablet by mouth Daily. 90 tablet 1   • PEG-KCl-NaCl-NaSulf-Na Asc-C (MoviPrep) 100 g reconstituted solution powder Take 1,000 mL by mouth Every 12 (Twelve) Hours. 1 each 0   • tolterodine LA (DETROL LA) 4 MG 24 hr capsule Take 1 capsule by mouth Daily. 90 capsule 3     No current facility-administered medications on file prior to visit.        Assessment and Plan    Diagnoses and all orders for this visit:    1. Follow-up exam (Primary)    2. Postconcussive syndrome  Comments:  Symptoms are improving, pain in the orbital region is improving we will continue to monitor    3. Essential hypertension  Comments:  Blood pressure stable recheck is at 112/78.  We will continue on current medication regimen    4. Chronic midline low back pain without sciatica  Comments:  We will get x-ray of back to rule out any acute bony abnormality.  Patient is agreeable  Orders:  -     XR Spine Lumbar 4+ View; Future    5. Bilateral hearing loss, unspecified hearing loss type  Comments:  Will refer over to audiology  Orders:  -     Ambulatory Referral to Audiology    6. Osteopenia of neck of femur,  unspecified laterality  Comments:  Will order DEXA scan  Orders:  -     DEXA Bone Density Axial    7. Age-related osteoporosis without current pathological fracture   -     DEXA Bone Density Axial        Follow Up   No follow-ups on file.  Patient was given instructions and counseling regarding her condition or for health maintenance advice. Please see specific information pulled into the AVS if appropriate.

## 2022-08-19 ENCOUNTER — ANESTHESIA EVENT (OUTPATIENT)
Dept: GASTROENTEROLOGY | Facility: HOSPITAL | Age: 67
End: 2022-08-19

## 2022-08-19 ENCOUNTER — ANESTHESIA (OUTPATIENT)
Dept: GASTROENTEROLOGY | Facility: HOSPITAL | Age: 67
End: 2022-08-19

## 2022-08-19 ENCOUNTER — HOSPITAL ENCOUNTER (OUTPATIENT)
Facility: HOSPITAL | Age: 67
Setting detail: HOSPITAL OUTPATIENT SURGERY
Discharge: HOME | End: 2022-08-19
Attending: INTERNAL MEDICINE | Admitting: INTERNAL MEDICINE

## 2022-08-19 VITALS
RESPIRATION RATE: 15 BRPM | OXYGEN SATURATION: 98 % | TEMPERATURE: 98.2 F | SYSTOLIC BLOOD PRESSURE: 153 MMHG | BODY MASS INDEX: 34.74 KG/M2 | HEART RATE: 74 BPM | WEIGHT: 183.86 LBS | DIASTOLIC BLOOD PRESSURE: 88 MMHG

## 2022-08-19 DIAGNOSIS — Z86.010 HISTORY OF COLON POLYPS: ICD-10-CM

## 2022-08-19 PROCEDURE — 45385 COLONOSCOPY W/LESION REMOVAL: CPT | Performed by: INTERNAL MEDICINE

## 2022-08-19 PROCEDURE — 88305 TISSUE EXAM BY PATHOLOGIST: CPT | Performed by: INTERNAL MEDICINE

## 2022-08-19 PROCEDURE — 25010000002 PROPOFOL 10 MG/ML EMULSION: Performed by: NURSE ANESTHETIST, CERTIFIED REGISTERED

## 2022-08-19 RX ORDER — PROPOFOL 10 MG/ML
VIAL (ML) INTRAVENOUS AS NEEDED
Status: DISCONTINUED | OUTPATIENT
Start: 2022-08-19 | End: 2022-08-19 | Stop reason: SURG

## 2022-08-19 RX ORDER — LIDOCAINE HYDROCHLORIDE 20 MG/ML
INJECTION, SOLUTION EPIDURAL; INFILTRATION; INTRACAUDAL; PERINEURAL AS NEEDED
Status: DISCONTINUED | OUTPATIENT
Start: 2022-08-19 | End: 2022-08-19 | Stop reason: SURG

## 2022-08-19 RX ORDER — SODIUM CHLORIDE, SODIUM LACTATE, POTASSIUM CHLORIDE, CALCIUM CHLORIDE 600; 310; 30; 20 MG/100ML; MG/100ML; MG/100ML; MG/100ML
30 INJECTION, SOLUTION INTRAVENOUS CONTINUOUS
Status: DISCONTINUED | OUTPATIENT
Start: 2022-08-19 | End: 2022-08-19 | Stop reason: HOSPADM

## 2022-08-19 RX ADMIN — LIDOCAINE HYDROCHLORIDE 50 MG: 20 INJECTION, SOLUTION EPIDURAL; INFILTRATION; INTRACAUDAL; PERINEURAL at 09:54

## 2022-08-19 RX ADMIN — PROPOFOL 100 MG: 10 INJECTION, EMULSION INTRAVENOUS at 09:54

## 2022-08-19 RX ADMIN — PROPOFOL 175 MCG/KG/MIN: 10 INJECTION, EMULSION INTRAVENOUS at 09:54

## 2022-08-19 RX ADMIN — SODIUM CHLORIDE, POTASSIUM CHLORIDE, SODIUM LACTATE AND CALCIUM CHLORIDE: 600; 310; 30; 20 INJECTION, SOLUTION INTRAVENOUS at 09:52

## 2022-08-19 NOTE — ANESTHESIA POSTPROCEDURE EVALUATION
Patient: Vanna Gil    Procedure Summary     Date: 08/19/22 Room / Location: [facility] ENDOSCOPY 2 / [facility] ENDOSCOPY    Anesthesia Start: 0952 Anesthesia Stop: 1020    Procedure: COLONOSCOPY WITH POLYPECTOMY (N/A ) Diagnosis:       History of colon polyps      (History of colon polyps [Z86.010])    Surgeons: Michael Clarke MD Provider: Zac Feldman MD    Anesthesia Type: general ASA Status: 2          Anesthesia Type: general    Vitals  Vitals Value Taken Time   /88 08/19/22 1045   Temp 36.8 °C (98.2 °F) 08/19/22 1045   Pulse 74 08/19/22 1045   Resp 15 08/19/22 1045   SpO2 98 % 08/19/22 1045           Post Anesthesia Care and Evaluation    Patient location during evaluation: bedside  Patient participation: complete - patient participated  Level of consciousness: awake  Pain score: 0  Pain management: adequate    Airway patency: patent  Anesthetic complications: No anesthetic complications  PONV Status: none  Cardiovascular status: acceptable and stable  Respiratory status: acceptable and room air  Hydration status: acceptable    Comments: An Anesthesiologist personally participated in the most demanding procedures (including induction and emergence if applicable) in the anesthesia plan, monitored the course of anesthesia administration at frequent intervals and remained physically present and available for immediate diagnosis and treatment of emergencies.

## 2022-08-19 NOTE — NURSING NOTE
IV inserted and maintained.  Patient educated on procedure, discharge criteria, and discharge instructions.  Consent explained, signed, and witness signature provided.  Warm blanket, low lights offered.  Will continue to update patient on procedure time.    Aurora Jacobson RN 08:43 EDT 8/19/2022

## 2022-08-19 NOTE — H&P
ScreeningPre Procedure History & Physical    Chief Complaint:   Screening     Subjective     HPI:   Screening     Past Medical History:   Past Medical History:   Diagnosis Date   • Acid reflux disease    • Anemia    • Anxiety    • Arthritis    • Asthma    • Bladder disorder    • Colon polyp 09/20/2018    Precancerous   • Depression 01/14/2020   • Essential hypertension 02/14/2020   • H/O hernia repair    • Hearing loss    • Hemorrhoids    • Hyperlipemia    • Hyperlipidemia    • OAB (overactive bladder) 02/14/2020   • Obesity ?   • Pneumonia 04/05/2014   • Shortness of breath    • Visual impairment ?    Only for reading       Past Surgical History:  Past Surgical History:   Procedure Laterality Date   • APPENDECTOMY     • BACK SURGERY     • BREAST BIOPSY Left    • COLONOSCOPY  09/20/2018    [facility] Repeat in 5 years   • ENDOSCOPY  2018   • FRACTURE SURGERY  12-    Distal Radius Fracture - Right Wrist   • HERNIA REPAIR     • JOINT REPLACEMENT      Artificial Joints/Limbs   • KNEE ARTHROPLASTY, PARTIAL REPLACEMENT      Meniscus Tear   • SPINE SURGERY     • TONSILLECTOMY         Family History:  Family History   Problem Relation Age of Onset   • Breast cancer Mother    • Cancer Mother         Breast   • Vision loss Mother         Only for reading   • Other Mother         Ms   • Heart disease Father         Heart Pacemaker   • Hearing loss Father         Hearing Loss   • Hyperlipidemia Father    • Vision loss Father         Only for reading   • Heart disease Brother    • Early death Brother         Heart Attack   • Heart disease Brother         Heart Pacemaker   • Vision loss Brother         Only for reading   • Malig Hyperthermia Neg Hx        Social History:   reports that she quit smoking about 17 years ago. Her smoking use included cigarettes. She started smoking about 49 years ago. She has a 30.00 pack-year smoking history. She has never used smokeless tobacco. She reports current alcohol use of about 1.0  standard drink of alcohol per week. She reports previous drug use. Drug: Marijuana.    Medications:   Medications Prior to Admission   Medication Sig Dispense Refill Last Dose   • amitriptyline (ELAVIL) 25 MG tablet Take 0.5 tablets by mouth Every Night. 90 tablet 0    • atorvastatin (LIPITOR) 40 MG tablet Take 1 tablet by mouth Every Night. 90 tablet 3    • calcium carbonate (OS-AIMEE) 1250 (500 Ca) MG tablet Take 1 tablet by mouth 2 (two) times a day. 180 tablet 3    • carvedilol (COREG) 3.125 MG tablet Take 1 tablet by mouth 2 (two) times a day. 180 tablet 3    • cholecalciferol (Cholecalciferol) 25 MCG (1000 UT) tablet Take 2 tablets by mouth Every Morning. 180 tablet 3    • multivitamin with minerals tablet tablet Take 1 tablet by mouth Daily.      • NIFEdipine CC (ADALAT CC) 60 MG 24 hr tablet Take 1 tablet by mouth Daily. 90 tablet 3    • pantoprazole (PROTONIX) 40 MG EC tablet Take 1 tablet by mouth Daily. 90 tablet 1    • tolterodine LA (DETROL LA) 4 MG 24 hr capsule Take 1 capsule by mouth Daily. 90 capsule 3    • loratadine (CLARITIN) 10 MG tablet Take 1 tablet by mouth Every Night. 90 tablet 2    • PEG-KCl-NaCl-NaSulf-Na Asc-C (MoviPrep) 100 g reconstituted solution powder Take 1,000 mL by mouth Every 12 (Twelve) Hours. 1 each 0        Allergies:  Levofloxacin, Methylprednisolone, Naproxen, and Naproxen sodium        Objective     Blood pressure 157/90, pulse 75, temperature 97.7 °F (36.5 °C), temperature source Temporal, resp. rate 20, weight 83.4 kg (183 lb 13.8 oz), SpO2 97 %, not currently breastfeeding.    Physical Exam   Constitutional: Pt is oriented to person, place, and time and well-developed, well-nourished, and in no distress.   Mouth/Throat: Oropharynx is clear and moist.   Neck: Normal range of motion.   Cardiovascular: Normal rate, regular rhythm and normal heart sounds.    Pulmonary/Chest: Effort normal and breath sounds normal.   Abdominal: Soft. Nontender  Skin: Skin is warm and dry.    Psychiatric: Mood, memory, affect and judgment normal.     Assessment & Plan     Diagnosis:  Screening colonoscopy  H/o colon polyps     Anticipated Surgical Procedure:  colonoscopy    The risks, benefits, and alternatives of this procedure have been discussed with the patient or the responsible party- the patient understands and agrees to proceed.

## 2022-08-19 NOTE — ANESTHESIA PREPROCEDURE EVALUATION
Anesthesia Evaluation     Patient summary reviewed and Nursing notes reviewed   no history of anesthetic complications:  NPO Solid Status: > 8 hours  NPO Liquid Status: > 2 hours           Airway   Mallampati: III  TM distance: >3 FB  Neck ROM: full  No difficulty expected  Dental      Pulmonary - normal exam    breath sounds clear to auscultation  (+) pneumonia , asthma,shortness of breath,   Cardiovascular - normal exam  Exercise tolerance: good (4-7 METS)    Rhythm: regular  Rate: normal    (+) hypertension, hyperlipidemia,       Neuro/Psych  (+) numbness, psychiatric history,    GI/Hepatic/Renal/Endo    (+)  GERD,      Musculoskeletal     Abdominal    Substance History - negative use     OB/GYN negative ob/gyn ROS         Other   arthritis,                    Anesthesia Plan    ASA 2     general     (Total IV Anesthesia    Patient understands anesthesia not responsible for dental damage.  )  intravenous induction     Anesthetic plan, risks, benefits, and alternatives have been provided, discussed and informed consent has been obtained with: patient.    Plan discussed with CRNA.        CODE STATUS:

## 2022-08-22 LAB
CYTO UR: NORMAL
LAB AP CASE REPORT: NORMAL
LAB AP CLINICAL INFORMATION: NORMAL
PATH REPORT.FINAL DX SPEC: NORMAL
PATH REPORT.GROSS SPEC: NORMAL

## 2022-09-23 ENCOUNTER — TELEPHONE (OUTPATIENT)
Dept: FAMILY MEDICINE CLINIC | Facility: CLINIC | Age: 67
End: 2022-09-23

## 2022-09-23 RX ORDER — NIFEDIPINE 60 MG/1
60 TABLET, FILM COATED, EXTENDED RELEASE ORAL DAILY
Qty: 90 TABLET | Refills: 3 | Status: SHIPPED | OUTPATIENT
Start: 2022-09-23

## 2022-09-23 RX ORDER — IBUPROFEN 200 MG
1 CAPSULE ORAL 2 TIMES DAILY
Qty: 180 TABLET | Refills: 3 | Status: SHIPPED | OUTPATIENT
Start: 2022-09-23

## 2022-09-23 RX ORDER — ATORVASTATIN CALCIUM 40 MG/1
40 TABLET, FILM COATED ORAL NIGHTLY
Qty: 90 TABLET | Refills: 3 | Status: SHIPPED | OUTPATIENT
Start: 2022-09-23

## 2022-09-23 RX ORDER — CARVEDILOL 3.12 MG/1
3.12 TABLET ORAL 2 TIMES DAILY WITH MEALS
Qty: 180 TABLET | Refills: 3 | Status: SHIPPED | OUTPATIENT
Start: 2022-09-23

## 2022-09-23 RX ORDER — TOLTERODINE 4 MG/1
4 CAPSULE, EXTENDED RELEASE ORAL DAILY
Qty: 90 CAPSULE | Refills: 3 | Status: SHIPPED | OUTPATIENT
Start: 2022-09-23 | End: 2023-03-16

## 2022-09-23 NOTE — TELEPHONE ENCOUNTER
PATIENT IS REQUESTING REFILL'S OF THE FOLLOWING PRESCRIPTIONS;    CALCIUM CARB 1,250 (500MG)  TOLTERODINE 4 MG  ATORVASTATIN 40 MG   CARVEDILOL 3.125 MG  NIFEDIPINE 60 MG     PLEASE ADVISE

## 2022-10-19 ENCOUNTER — CLINICAL SUPPORT (OUTPATIENT)
Dept: FAMILY MEDICINE CLINIC | Facility: CLINIC | Age: 67
End: 2022-10-19

## 2022-10-19 DIAGNOSIS — Z23 NEED FOR INFLUENZA VACCINATION: Primary | ICD-10-CM

## 2022-10-19 PROCEDURE — 90662 IIV NO PRSV INCREASED AG IM: CPT | Performed by: NURSE PRACTITIONER

## 2022-10-19 PROCEDURE — G0008 ADMIN INFLUENZA VIRUS VAC: HCPCS | Performed by: NURSE PRACTITIONER

## 2022-11-09 ENCOUNTER — HOSPITAL ENCOUNTER (OUTPATIENT)
Dept: BONE DENSITY | Facility: HOSPITAL | Age: 67
Discharge: HOME | End: 2022-11-09
Admitting: NURSE PRACTITIONER

## 2022-11-09 PROCEDURE — 77080 DXA BONE DENSITY AXIAL: CPT

## 2022-11-16 ENCOUNTER — OFFICE VISIT (OUTPATIENT)
Dept: FAMILY MEDICINE CLINIC | Facility: CLINIC | Age: 67
End: 2022-11-16

## 2022-11-16 VITALS
OXYGEN SATURATION: 97 % | HEART RATE: 73 BPM | WEIGHT: 189.4 LBS | SYSTOLIC BLOOD PRESSURE: 140 MMHG | BODY MASS INDEX: 35.76 KG/M2 | TEMPERATURE: 96.9 F | DIASTOLIC BLOOD PRESSURE: 92 MMHG | HEIGHT: 61 IN

## 2022-11-16 DIAGNOSIS — F32.A DEPRESSION, UNSPECIFIED DEPRESSION TYPE: ICD-10-CM

## 2022-11-16 DIAGNOSIS — Z09 FOLLOW-UP EXAM: Primary | ICD-10-CM

## 2022-11-16 PROCEDURE — 99213 OFFICE O/P EST LOW 20 MIN: CPT | Performed by: NURSE PRACTITIONER

## 2022-11-16 RX ORDER — AMITRIPTYLINE HYDROCHLORIDE 25 MG/1
TABLET, FILM COATED ORAL
Qty: 30 TABLET | Refills: 0
Start: 2022-11-16

## 2022-11-16 NOTE — PROGRESS NOTES
Chief Complaint  Depression    Subjective        Medical History: has a past medical history of Acid reflux disease, Anemia, Anxiety, Arthritis, Asthma, Bladder disorder, Colon polyp (09/20/2018), Depression (01/14/2020), Essential hypertension (02/14/2020), H/O hernia repair, Hearing loss, Hemorrhoids, Hyperlipemia, Hyperlipidemia, OAB (overactive bladder) (02/14/2020), Obesity (?), Pneumonia (04/05/2014), Shortness of breath, and Visual impairment (?).     Surgical History: has a past surgical history that includes Appendectomy; Joint replacement; Back surgery; Breast biopsy (Left); Esophagogastroduodenoscopy (2018); Hernia repair; knee arthroplasty, partial replacement; Spine surgery; Tonsillectomy; Fracture surgery (12-); Colonoscopy (09/20/2018); and Colonoscopy (N/A, 8/19/2022).     Family History: family history includes Breast cancer in her mother; Cancer in her mother; Early death in her brother; Hearing loss in her father; Heart disease in her brother, brother, and father; Hyperlipidemia in her father; Other in her mother; Vision loss in her brother, father, and mother.     Social History: reports that she quit smoking about 17 years ago. Her smoking use included cigarettes. She started smoking about 49 years ago. She has a 30.00 pack-year smoking history. She has never used smokeless tobacco. She reports current alcohol use of about 1.0 standard drink per week. She reports that she does not currently use drugs after having used the following drugs: Marijuana.    Vanna Gil presents to [facility]  History of Present Illness  She comes in today wanting to discontinue her amitriptyline.  Patient is currently on 12.5 mg amitriptyline for depression she takes it nightly.  She states the medication did help but she feels like things are better and she would like to come off the medication.  Patient states that depression is no longer an issue, but wants guidance on  "how to come off the medication.      Objective   Vital Signs:   /92   Pulse 73   Temp 96.9 °F (36.1 °C)   Ht 154.9 cm (61\")   Wt 85.9 kg (189 lb 6.4 oz)   SpO2 97%   BMI 35.79 kg/m²       Wt Readings from Last 3 Encounters:   11/16/22 85.9 kg (189 lb 6.4 oz)   08/19/22 83.4 kg (183 lb 13.8 oz)   08/05/22 84.8 kg (187 lb)        BP Readings from Last 3 Encounters:   11/16/22 140/92   08/19/22 153/88   08/05/22 142/90        Class 2 Severe Obesity (BMI >=35 and <=39.9). Obesity-related health conditions include the following: hypertension and dyslipidemias. Obesity is improving with treatment. BMI is is above average; BMI management plan is completed. We discussed portion control and increasing exercise.       Physical Exam  Vitals reviewed.   Constitutional:       Appearance: Normal appearance. She is well-developed.   HENT:      Head: Normocephalic and atraumatic.   Eyes:      Conjunctiva/sclera: Conjunctivae normal.      Pupils: Pupils are equal, round, and reactive to light.   Cardiovascular:      Rate and Rhythm: Normal rate and regular rhythm.      Heart sounds: No murmur heard.  Pulmonary:      Effort: Pulmonary effort is normal.      Breath sounds: Normal breath sounds. No wheezing or rhonchi.   Skin:     General: Skin is warm and dry.   Neurological:      Mental Status: She is alert and oriented to person, place, and time.   Psychiatric:         Mood and Affect: Mood and affect normal.         Behavior: Behavior normal.         Thought Content: Thought content normal.         Judgment: Judgment normal.        Result Review :  {The following data was reviewed by LATOSHA Watters on 11/16/2022.  Common labs    Common Labs 3/11/22 3/11/22 6/29/22 6/29/22    1512 1512 0956 0956   Glucose  87  89   BUN  19  15   Creatinine  0.75  0.72   Sodium  141  142   Potassium  4.0  4.0   Chloride  103  104   Calcium  10.3  9.6   Albumin  4.70  4.30   Total Bilirubin  0.4  0.4   Alkaline Phosphatase  104 "  99   AST (SGOT)  25  21   ALT (SGPT)  20  15   WBC   4.74    Hemoglobin   13.6    Hematocrit   40.7    Platelets   265    Total Cholesterol 190      Triglycerides 178 (A)      HDL Cholesterol 53      LDL Cholesterol  106 (A)      (A) Abnormal value            Data reviewed: Previous Ibeth Wilder and Amalia Lazo notes            Current Outpatient Medications on File Prior to Visit   Medication Sig Dispense Refill   • atorvastatin (LIPITOR) 40 MG tablet Take 1 tablet by mouth Every Night. 90 tablet 3   • calcium carbonate (OS-AIMEE) 1250 (500 Ca) MG tablet Take 1 tablet by mouth 2 (Two) Times a Day. 180 tablet 3   • carvedilol (COREG) 3.125 MG tablet Take 1 tablet by mouth 2 (Two) Times a Day With Meals. 180 tablet 3   • cholecalciferol (Cholecalciferol) 25 MCG (1000 UT) tablet Take 2 tablets by mouth Every Morning. 180 tablet 3   • multivitamin with minerals tablet tablet Take 1 tablet by mouth Daily.     • NIFEdipine CC (ADALAT CC) 60 MG 24 hr tablet Take 1 tablet by mouth Daily. 90 tablet 3   • pantoprazole (PROTONIX) 40 MG EC tablet Take 1 tablet by mouth Daily. 90 tablet 1   • PEG-KCl-NaCl-NaSulf-Na Asc-C (MoviPrep) 100 g reconstituted solution powder Take 1,000 mL by mouth Every 12 (Twelve) Hours. 1 each 0   • tolterodine LA (DETROL LA) 4 MG 24 hr capsule Take 1 capsule by mouth Daily. 90 capsule 3   • [DISCONTINUED] amitriptyline (ELAVIL) 25 MG tablet Take 0.5 tablets by mouth Every Night. 90 tablet 0   • [DISCONTINUED] loratadine (CLARITIN) 10 MG tablet Take 1 tablet by mouth Every Night. 90 tablet 2     No current facility-administered medications on file prior to visit.        Assessment and Plan  Diagnoses and all orders for this visit:    1. Follow-up exam (Primary)    2. Depression, unspecified depression type  Comments:  Discussed taking the amitriptyline every other day for 2 weeks and stop med.  Discussed with patient if she feels like she needs to go back o on a med     Other orders  -      amitriptyline (ELAVIL) 25 MG tablet; Take 1/2 tablet every other day x2 weeks then stop  Dispense: 30 tablet; Refill: 0        Follow Up   No follow-ups on file.  Patient was given instructions and counseling regarding her condition or for health maintenance advice. Please see specific information pulled into the AVS if appropriate.       Part of this note may be electronic transcription/translation of spoken language to printed text using the Dragon dictation system

## 2022-12-23 NOTE — PROGRESS NOTES
Progress Note      Patient Name: Vnana Gil   Patient ID: [id number]   Sex: Female   YOB: 1955    Primary Care Provider: Ibeth REINA   Referring Provider: Ibeth REINA    Visit Date: January 5, 2021    Provider: Stephanie Leonardo PA-C   Location: [facility]   Location Address: [address] [place] [address]  [id number]   Location Phone: [phone]          Chief Complaint  · Surgery follow up right wrist       History Of Present Illness  Vanna Gil is a 65 year old /White female who presents today to [facility].      She is s/p ORIF right 4-part distal radius fracture 12/18/20 by Dr. Quiroz. Pain is well controlled. She comes in intact splint today.       Past Medical History  Anemia; Anemia, Unspecified; Anxiety; Arthritis; Asthma; Bladder Disorder; Depression; Depression; Essential hypertension; Hearing loss; Hemorrhoids; Hernia; Hyperlipemia; Hyperlipidemia; Hypertension; OAB (overactive bladder); Reflux; Reflux Disease; Shortness of Breath         Past Surgical History  Appendectomy; Artificial Joints/Limbs; Back; Breast biopsy, left breast; Colonoscopy; EGD; Hernia Repair; Knee Replacement; MENISCUS TEAR; Spinal Surgery; Tonsilectomy         Medication List  atorvastatin 40 mg oral tablet; calcium carbonate 500 mg calcium (1,250 mg) oral tablet; carvedilol 3.125 mg oral tablet; Detrol LA 4 mg oral capsule,extended release 24hr; hydroxyzine HCl 25 mg oral tablet; loratadine 10 mg oral tablet; Macrobid 100 mg oral capsule; nifedipine 60 mg oral tablet extended release; Vitamin D3 50 mcg (2,000 unit) oral capsule; Zoloft 50 mg oral tablet         Allergy List  Aleve; Levaquin; Medrol       Allergies Reconciled  Family Medical History  Heart Disease; Cancer, Unspecified; - No Family History of Colorectal Cancer; Family history of breast cancer; Family history of heart disease         Social History  Alcohol (Current some day); Alcohol  "Use (Current some day); lives alone; Recreational Drug Use (Never); Retired.; Sedentary; Tobacco (Former);          Review of Systems  · Constitutional  o Denies  o : fever, chills, weight loss  · Cardiovascular  o Denies  o : chest pain, shortness of breath  · Gastrointestinal  o Denies  o : liver disease, heartburn, nausea, blood in stools  · Genitourinary  o Denies  o : painful urination, blood in urine  · Integument  o Denies  o : rash, itching  · Neurologic  o Denies  o : headache, weakness, loss of consciousness  · Musculoskeletal  o Admits  o : painful, swollen joints  · Psychiatric  o Denies  o : drug/alcohol addiction, anxiety, depression      Vitals  Date Time BP Position Site L\R Cuff Size HR RR TEMP (F) WT  HT  BMI kg/m2 BSA m2 O2 Sat FR L/min FiO2 HC       01/05/2021 01:11 PM      69 - R   184lbs 0oz 5'  1\" 34.77 1.9 98 %            Physical Examination  · Constitutional  o Appearance  o : well developed, well-nourished, no obvious deformities present  · Head and Face  o Head  o :   § Inspection  § : normocephalic  o Face  o :   § Inspection  § : no facial lesions  · Eyes  o Conjunctivae  o : conjunctivae normal  o Sclerae  o : sclerae white  · Ears, Nose, Mouth and Throat  o Ears  o :   § External Ears  § : appearance within normal limits  § Hearing  § : intact  o Nose  o :   § External Nose  § : appearance normal  · Neck  o Inspection/Palpation  o : normal appearance  o Range of Motion  o : full range of motion  · Respiratory  o Respiratory Effort  o : breathing unlabored  o Inspection of Chest  o : normal appearance  o Auscultation of Lungs  o : no audible wheezing or rales  · Cardiovascular  o Heart  o : regular rate  · Gastrointestinal  o Abdominal Examination  o : soft and non-tender  · Skin and Subcutaneous Tissue  o General Inspection  o : intact, no rashes  · Psychiatric  o General  o : Alert and oriented x3  o Judgement and Insight  o : judgment and insight intact  o Mood and Affect  o : " mood normal, affect appropriate  · Right Wrist  o Inspection  o : + Bruising and swelling. Incision well approximated. Digits well perfused. All compartments soft. Sensation grossly intact.   · In Office Procedures  o View  o : AP/LATERAL  o Site  o : right, wrist   o Indication  o : Right arm pain   o Study  o : X-rays ordered, taken in the office, and reviewed today.  o Xray  o : Well aligned distal radius fracture. Hardware intact.   o Comparative Data  o : Comparative Data found and reviewed today   · Casting  o Extremity  o : right wrist, Short arm cast   o Procedure  o : Patient was placed in fiberglass cast today. The patient tolerated the procedure without any complications.           Assessment  · Aftercare following surgery of the muskuloskeletal system     V54.81  · Pain: Wrist     719.43/M25.539      Plan  · Orders  o Cast application (69298) - 719.43/M25.539 - 01/05/2021  o Casting Supplies (Short Arm) Adult () - 719.43/M25.539 - 01/05/2021  o Wrist (Right) 2 views X-Ray [facility] ([id number]) - 719.43/M25.539 - 01/05/2021  · Medications  o Medications have been Reconciled  o Transition of Care or Provider Policy  · Instructions  o Reviewed the patient's Past Medical, Social, and Family history as well as the ROS at today's visit, no changes.  o Call or return if worsening symptoms.  o Cast applied. Follow up 3 weeks. Repeat x-rays.   o Electronically Identified Patient Education Materials Provided Electronically            Electronically Signed by: Stephanie Leonardo PA-C -Author on January 5, 2021 01:41:52 PM   122

## 2023-01-24 ENCOUNTER — TELEPHONE (OUTPATIENT)
Dept: FAMILY MEDICINE CLINIC | Facility: CLINIC | Age: 68
End: 2023-01-24
Payer: MEDICARE

## 2023-01-25 RX ORDER — PANTOPRAZOLE SODIUM 40 MG/1
40 TABLET, DELAYED RELEASE ORAL DAILY
Qty: 90 TABLET | Refills: 1 | Status: SHIPPED | OUTPATIENT
Start: 2023-01-25 | End: 2023-01-25 | Stop reason: SDUPTHER

## 2023-01-25 RX ORDER — PANTOPRAZOLE SODIUM 40 MG/1
40 TABLET, DELAYED RELEASE ORAL DAILY
Qty: 90 TABLET | Refills: 1 | Status: SHIPPED | OUTPATIENT
Start: 2023-01-25

## 2023-02-21 ENCOUNTER — OFFICE VISIT (OUTPATIENT)
Dept: FAMILY MEDICINE CLINIC | Facility: CLINIC | Age: 68
End: 2023-02-21
Payer: MEDICARE

## 2023-02-21 VITALS
DIASTOLIC BLOOD PRESSURE: 74 MMHG | HEIGHT: 61 IN | SYSTOLIC BLOOD PRESSURE: 118 MMHG | WEIGHT: 192 LBS | HEART RATE: 67 BPM | OXYGEN SATURATION: 97 % | BODY MASS INDEX: 36.25 KG/M2 | TEMPERATURE: 98.2 F

## 2023-02-21 DIAGNOSIS — G89.29 CHRONIC BILATERAL LOW BACK PAIN WITHOUT SCIATICA: ICD-10-CM

## 2023-02-21 DIAGNOSIS — M54.50 CHRONIC BILATERAL LOW BACK PAIN WITHOUT SCIATICA: ICD-10-CM

## 2023-02-21 DIAGNOSIS — Z71.89 LIVING WILL, COUNSELING/DISCUSSION: Primary | ICD-10-CM

## 2023-02-21 PROCEDURE — 99213 OFFICE O/P EST LOW 20 MIN: CPT | Performed by: NURSE PRACTITIONER

## 2023-02-21 RX ORDER — CALCIUM CARBONATE 500(1250)
TABLET ORAL
COMMUNITY
Start: 2022-12-26 | End: 2023-03-16

## 2023-02-21 NOTE — PROGRESS NOTES
"Chief Complaint  living will     Subjective         Vanna Gil presents to [facility]  HPI   Presents today with concerns of her living will advance directive.  She was filling out a questionnaire for her living well and had concerns or questions guards to her advance directive.  Patient states she does not want life-saving measures including CPR, intubation, and tube feeds.    Patient reports having low back pain has been intermittent Low back pain.  Radiates to her right side.  She had seen Julianna Orr in  had ordered an x-ray of her low back.  She has not completed the x-ray yet.    Social History     Socioeconomic History   • Marital status:    Tobacco Use   • Smoking status: Former     Packs/day: 1.00     Years: 30.00     Pack years: 30.00     Types: Cigarettes     Start date: 1973     Quit date: 2004     Years since quittin.2   • Smokeless tobacco: Never   • Tobacco comments:     former smoker   Vaping Use   • Vaping Use: Never used   Substance and Sexual Activity   • Alcohol use: Yes     Alcohol/week: 1.0 standard drink     Types: 1 Glasses of wine per week     Comment: currently some days; occasionally dinks, less than 1 drink per day   • Drug use: Not Currently     Types: Marijuana     Comment: Only a couple off times when  i was around 18.   • Sexual activity: Not Currently     Partners: Male     Birth control/protection: Other     Comment: The Pill ... To old now. Not interested anymore.        Objective     Vitals:    23 0845   BP: 118/74   Pulse: 67   Temp: 98.2 °F (36.8 °C)   SpO2: 97%   Weight: 87.1 kg (192 lb)   Height: 154.9 cm (61\")        Body mass index is 36.28 kg/m².    Wt Readings from Last 3 Encounters:   23 87.1 kg (192 lb)   22 85.9 kg (189 lb 6.4 oz)   22 83.4 kg (183 lb 13.8 oz)       BP Readings from Last 3 Encounters:   23 118/74   22 140/92   22 153/88         Physical " Exam  Vitals reviewed.   Constitutional:       Appearance: Normal appearance. She is well-developed.   HENT:      Head: Normocephalic and atraumatic.      Right Ear: External ear normal.      Left Ear: External ear normal.      Mouth/Throat:      Pharynx: No oropharyngeal exudate.   Eyes:      Conjunctiva/sclera: Conjunctivae normal.      Pupils: Pupils are equal, round, and reactive to light.   Cardiovascular:      Rate and Rhythm: Normal rate and regular rhythm.      Heart sounds: No murmur heard.    No friction rub. No gallop.   Pulmonary:      Effort: Pulmonary effort is normal.      Breath sounds: Normal breath sounds. No wheezing or rhonchi.   Abdominal:      General: Bowel sounds are normal. There is no distension.      Palpations: Abdomen is soft.      Tenderness: There is no abdominal tenderness.   Skin:     General: Skin is warm and dry.   Neurological:      Mental Status: She is alert and oriented to person, place, and time.   Psychiatric:         Mood and Affect: Mood and affect normal.         Behavior: Behavior normal.         Thought Content: Thought content normal.         Judgment: Judgment normal.          Result Review :   The following data was reviewed by: LATOSHA Hudson on 02/21/2023:      Procedures    Assessment and Plan   Diagnoses and all orders for this visit:    1. Living will, counseling/discussion (Primary)    2. Chronic bilateral low back pain without sciatica      Counseling and discussion of living will.  Recommended she also follow-up with an x-ray of her low back for low back pain.    Class 2 Severe Obesity (BMI >=35 and <=39.9). Obesity-related health conditions include the following: hypertension. Obesity is unchanged. BMI is is above average; BMI management plan is completed. We discussed portion control and increasing exercise.        Follow Up   Return if symptoms worsen or fail to improve.  Patient was given instructions and counseling regarding her condition or for  health maintenance advice. Please see specific information pulled into the AVS if appropriate.     Please note that portions of this note were completed with a voice recognition program.

## 2023-02-27 ENCOUNTER — HOSPITAL ENCOUNTER (OUTPATIENT)
Dept: GENERAL RADIOLOGY | Facility: HOSPITAL | Age: 68
Discharge: HOME | End: 2023-02-27
Admitting: NURSE PRACTITIONER
Payer: MEDICARE

## 2023-02-27 DIAGNOSIS — G89.29 CHRONIC MIDLINE LOW BACK PAIN WITHOUT SCIATICA: ICD-10-CM

## 2023-02-27 DIAGNOSIS — M54.50 CHRONIC MIDLINE LOW BACK PAIN WITHOUT SCIATICA: ICD-10-CM

## 2023-02-27 PROCEDURE — 72110 X-RAY EXAM L-2 SPINE 4/>VWS: CPT

## 2023-03-13 ENCOUNTER — APPOINTMENT (OUTPATIENT)
Dept: CT IMAGING | Facility: HOSPITAL | Age: 68
End: 2023-03-13
Payer: MEDICARE

## 2023-03-13 ENCOUNTER — HOSPITAL ENCOUNTER (EMERGENCY)
Facility: HOSPITAL | Age: 68
Discharge: HOME | End: 2023-03-13
Attending: EMERGENCY MEDICINE | Admitting: EMERGENCY MEDICINE
Payer: MEDICARE

## 2023-03-13 VITALS
OXYGEN SATURATION: 98 % | HEIGHT: 61 IN | SYSTOLIC BLOOD PRESSURE: 143 MMHG | BODY MASS INDEX: 35.96 KG/M2 | WEIGHT: 190.48 LBS | HEART RATE: 71 BPM | DIASTOLIC BLOOD PRESSURE: 87 MMHG | TEMPERATURE: 97.5 F | RESPIRATION RATE: 18 BRPM

## 2023-03-13 DIAGNOSIS — K44.9 HIATAL HERNIA: Primary | ICD-10-CM

## 2023-03-13 DIAGNOSIS — K52.9 COLITIS: ICD-10-CM

## 2023-03-13 LAB
ALBUMIN SERPL-MCNC: 4.5 G/DL (ref 3.5–5.2)
ALBUMIN/GLOB SERPL: 1.4 G/DL
ALP SERPL-CCNC: 129 U/L (ref 39–117)
ALT SERPL W P-5'-P-CCNC: 22 U/L (ref 1–33)
ANION GAP SERPL CALCULATED.3IONS-SCNC: 12.5 MMOL/L (ref 5–15)
AST SERPL-CCNC: 29 U/L (ref 1–32)
BACTERIA UR QL AUTO: ABNORMAL /HPF
BASOPHILS # BLD AUTO: 0.08 10*3/MM3 (ref 0–0.2)
BASOPHILS NFR BLD AUTO: 1.1 % (ref 0–1.5)
BILIRUB SERPL-MCNC: 0.4 MG/DL (ref 0–1.2)
BILIRUB UR QL STRIP: NEGATIVE
BUN SERPL-MCNC: 22 MG/DL (ref 8–23)
BUN/CREAT SERPL: 24.2 (ref 7–25)
CALCIUM SPEC-SCNC: 10.7 MG/DL (ref 8.6–10.5)
CHLORIDE SERPL-SCNC: 100 MMOL/L (ref 98–107)
CLARITY UR: CLEAR
CO2 SERPL-SCNC: 26.5 MMOL/L (ref 22–29)
COLOR UR: YELLOW
CREAT SERPL-MCNC: 0.91 MG/DL (ref 0.57–1)
D-LACTATE SERPL-SCNC: 1.2 MMOL/L (ref 0.5–2)
DEPRECATED RDW RBC AUTO: 40.9 FL (ref 37–54)
EGFRCR SERPLBLD CKD-EPI 2021: 69.3 ML/MIN/1.73
EOSINOPHIL # BLD AUTO: 0.26 10*3/MM3 (ref 0–0.4)
EOSINOPHIL NFR BLD AUTO: 3.5 % (ref 0.3–6.2)
ERYTHROCYTE [DISTWIDTH] IN BLOOD BY AUTOMATED COUNT: 12.7 % (ref 12.3–15.4)
GLOBULIN UR ELPH-MCNC: 3.2 GM/DL
GLUCOSE SERPL-MCNC: 110 MG/DL (ref 65–99)
GLUCOSE UR STRIP-MCNC: NEGATIVE MG/DL
HCT VFR BLD AUTO: 40.2 % (ref 34–46.6)
HGB BLD-MCNC: 14.3 G/DL (ref 12–15.9)
HGB UR QL STRIP.AUTO: NEGATIVE
HOLD SPECIMEN: NORMAL
HOLD SPECIMEN: NORMAL
HYALINE CASTS UR QL AUTO: ABNORMAL /LPF
IMM GRANULOCYTES # BLD AUTO: 0.02 10*3/MM3 (ref 0–0.05)
IMM GRANULOCYTES NFR BLD AUTO: 0.3 % (ref 0–0.5)
KETONES UR QL STRIP: NEGATIVE
LEUKOCYTE ESTERASE UR QL STRIP.AUTO: ABNORMAL
LIPASE SERPL-CCNC: 27 U/L (ref 13–60)
LYMPHOCYTES # BLD AUTO: 2.04 10*3/MM3 (ref 0.7–3.1)
LYMPHOCYTES NFR BLD AUTO: 27.3 % (ref 19.6–45.3)
MCH RBC QN AUTO: 31.2 PG (ref 26.6–33)
MCHC RBC AUTO-ENTMCNC: 35.6 G/DL (ref 31.5–35.7)
MCV RBC AUTO: 87.8 FL (ref 79–97)
MONOCYTES # BLD AUTO: 0.51 10*3/MM3 (ref 0.1–0.9)
MONOCYTES NFR BLD AUTO: 6.8 % (ref 5–12)
NEUTROPHILS NFR BLD AUTO: 4.57 10*3/MM3 (ref 1.7–7)
NEUTROPHILS NFR BLD AUTO: 61 % (ref 42.7–76)
NITRITE UR QL STRIP: NEGATIVE
NRBC BLD AUTO-RTO: 0 /100 WBC (ref 0–0.2)
PH UR STRIP.AUTO: 7 [PH] (ref 5–8)
PLATELET # BLD AUTO: 265 10*3/MM3 (ref 140–450)
PMV BLD AUTO: 9.7 FL (ref 6–12)
POTASSIUM SERPL-SCNC: 3.9 MMOL/L (ref 3.5–5.2)
PROT SERPL-MCNC: 7.7 G/DL (ref 6–8.5)
PROT UR QL STRIP: NEGATIVE
RBC # BLD AUTO: 4.58 10*6/MM3 (ref 3.77–5.28)
RBC # UR STRIP: ABNORMAL /HPF
REF LAB TEST METHOD: ABNORMAL
SODIUM SERPL-SCNC: 139 MMOL/L (ref 136–145)
SP GR UR STRIP: 1.01 (ref 1–1.03)
SQUAMOUS #/AREA URNS HPF: ABNORMAL /HPF
UROBILINOGEN UR QL STRIP: ABNORMAL
WBC # UR STRIP: ABNORMAL /HPF
WBC NRBC COR # BLD: 7.48 10*3/MM3 (ref 3.4–10.8)
WHOLE BLOOD HOLD COAG: NORMAL
WHOLE BLOOD HOLD SPECIMEN: NORMAL

## 2023-03-13 PROCEDURE — 96375 TX/PRO/DX INJ NEW DRUG ADDON: CPT

## 2023-03-13 PROCEDURE — 96374 THER/PROPH/DIAG INJ IV PUSH: CPT

## 2023-03-13 PROCEDURE — 83690 ASSAY OF LIPASE: CPT

## 2023-03-13 PROCEDURE — 36415 COLL VENOUS BLD VENIPUNCTURE: CPT

## 2023-03-13 PROCEDURE — 99283 EMERGENCY DEPT VISIT LOW MDM: CPT

## 2023-03-13 PROCEDURE — 80053 COMPREHEN METABOLIC PANEL: CPT

## 2023-03-13 PROCEDURE — 81001 URINALYSIS AUTO W/SCOPE: CPT

## 2023-03-13 PROCEDURE — 83605 ASSAY OF LACTIC ACID: CPT

## 2023-03-13 PROCEDURE — 25010000002 ONDANSETRON PER 1 MG: Performed by: NURSE PRACTITIONER

## 2023-03-13 PROCEDURE — 25510000001 IOPAMIDOL PER 1 ML: Performed by: PHYSICIAN ASSISTANT

## 2023-03-13 PROCEDURE — 74177 CT ABD & PELVIS W/CONTRAST: CPT

## 2023-03-13 PROCEDURE — 85025 COMPLETE CBC W/AUTO DIFF WBC: CPT

## 2023-03-13 RX ORDER — FAMOTIDINE 10 MG/ML
20 INJECTION, SOLUTION INTRAVENOUS ONCE
Status: COMPLETED | OUTPATIENT
Start: 2023-03-13 | End: 2023-03-13

## 2023-03-13 RX ORDER — LIDOCAINE HYDROCHLORIDE 20 MG/ML
15 SOLUTION OROPHARYNGEAL ONCE
Status: COMPLETED | OUTPATIENT
Start: 2023-03-13 | End: 2023-03-13

## 2023-03-13 RX ORDER — ALUMINA, MAGNESIA, AND SIMETHICONE 2400; 2400; 240 MG/30ML; MG/30ML; MG/30ML
15 SUSPENSION ORAL ONCE
Status: COMPLETED | OUTPATIENT
Start: 2023-03-13 | End: 2023-03-13

## 2023-03-13 RX ORDER — SODIUM CHLORIDE 0.9 % (FLUSH) 0.9 %
10 SYRINGE (ML) INJECTION AS NEEDED
Status: DISCONTINUED | OUTPATIENT
Start: 2023-03-13 | End: 2023-03-13 | Stop reason: HOSPADM

## 2023-03-13 RX ORDER — DICYCLOMINE HCL 20 MG
20 TABLET ORAL EVERY 8 HOURS PRN
Qty: 30 TABLET | Refills: 0 | Status: SHIPPED | OUTPATIENT
Start: 2023-03-13

## 2023-03-13 RX ORDER — ONDANSETRON 4 MG/1
4 TABLET, ORALLY DISINTEGRATING ORAL EVERY 8 HOURS PRN
Qty: 15 TABLET | Refills: 0 | Status: SHIPPED | OUTPATIENT
Start: 2023-03-13

## 2023-03-13 RX ORDER — METOCLOPRAMIDE 10 MG/1
10 TABLET ORAL
Qty: 120 TABLET | Refills: 0 | Status: SHIPPED | OUTPATIENT
Start: 2023-03-13 | End: 2023-04-12

## 2023-03-13 RX ORDER — AMOXICILLIN AND CLAVULANATE POTASSIUM 875; 125 MG/1; MG/1
1 TABLET, FILM COATED ORAL 2 TIMES DAILY
Qty: 14 TABLET | Refills: 0 | Status: SHIPPED | OUTPATIENT
Start: 2023-03-13 | End: 2023-03-20

## 2023-03-13 RX ORDER — ONDANSETRON 2 MG/ML
4 INJECTION INTRAMUSCULAR; INTRAVENOUS ONCE
Status: COMPLETED | OUTPATIENT
Start: 2023-03-13 | End: 2023-03-13

## 2023-03-13 RX ADMIN — ALUMINUM HYDROXIDE, MAGNESIUM HYDROXIDE, AND DIMETHICONE 15 ML: 400; 400; 40 SUSPENSION ORAL at 20:13

## 2023-03-13 RX ADMIN — ONDANSETRON 4 MG: 2 INJECTION INTRAMUSCULAR; INTRAVENOUS at 20:14

## 2023-03-13 RX ADMIN — LIDOCAINE HYDROCHLORIDE 15 ML: 20 SOLUTION ORAL; TOPICAL at 20:13

## 2023-03-13 RX ADMIN — FAMOTIDINE 20 MG: 10 INJECTION INTRAVENOUS at 20:14

## 2023-03-13 RX ADMIN — IOPAMIDOL 100 ML: 755 INJECTION, SOLUTION INTRAVENOUS at 19:02

## 2023-03-13 NOTE — ED PROVIDER NOTES
Time: 5:14 PM EDT  Date of encounter:  3/13/2023  Independent Historian/Clinical History and Information was obtained by:   Patient  Chief Complaint   Patient presents with   • Abdominal Pain     Abdominal Pain, EPIGASTRIC PAIN TODAY. HX HERNIA. NAUSEA NOTED.       History is limited by: N/A    History of Present Illness:  Patient is a 67 y.o. year old female who presents to the emergency department for evaluation of abdominal pain.  Abdominal pain is located centrally above umbilicus and below epigastrium.  Does have a history of a hernia.  Has had nausea and tried to vomit without relief.  Had 1 episode of diarrhea this morning.  Also states she has had a little bit of congestion. (Virginia Garcia PA-C provider in triage 5:15 PM EDT )     HPI    Patient Care Team  Primary Care Provider: Jair Orr APRN    Past Medical History:     Allergies   Allergen Reactions   • Levofloxacin Rash and Shortness Of Breath   • Methylprednisolone Other (See Comments)     Unsure of reaction  Possible tingling in head     • Naproxen Other (See Comments)     ' tickling on top of head'   • Naproxen Sodium Itching     Past Medical History:   Diagnosis Date   • Acid reflux disease    • Anemia    • Anxiety    • Arthritis    • Asthma    • Bladder disorder    • Colon polyp 09/20/2018    Precancerous   • Depression 01/14/2020   • Essential hypertension 02/14/2020   • H/O hernia repair    • Hearing loss    • Hemorrhoids    • Hyperlipemia    • Hyperlipidemia    • OAB (overactive bladder) 02/14/2020   • Obesity ?   • Pneumonia 04/05/2014   • Shortness of breath    • Visual impairment ?    Only for reading     Past Surgical History:   Procedure Laterality Date   • APPENDECTOMY     • BACK SURGERY     • BREAST BIOPSY Left    • COLONOSCOPY  09/20/2018    [facility] Repeat in 5 years   • COLONOSCOPY N/A 8/19/2022    Procedure: COLONOSCOPY WITH POLYPECTOMY;  Surgeon: Michael Clarke MD;  Location: [facility] ENDOSCOPY;  Service:  Gastroenterology;  Laterality: N/A;  COLON POLYP   • ENDOSCOPY  2018   • FRACTURE SURGERY  12-    Distal Radius Fracture - Right Wrist   • HERNIA REPAIR     • JOINT REPLACEMENT      Artificial Joints/Limbs   • KNEE ARTHROPLASTY, PARTIAL REPLACEMENT      Meniscus Tear   • SPINE SURGERY     • TONSILLECTOMY       Family History   Problem Relation Age of Onset   • Breast cancer Mother    • Cancer Mother         Breast   • Vision loss Mother         Only for reading   • Other Mother         Ms   • Heart disease Father         Heart Pacemaker   • Hearing loss Father         Hearing Loss   • Hyperlipidemia Father    • Vision loss Father         Only for reading   • Heart disease Brother    • Early death Brother         Heart Attack   • Heart disease Brother         Heart Pacemaker   • Vision loss Brother         Only for reading   • Malig Hyperthermia Neg Hx        Home Medications:  Prior to Admission medications    Medication Sig Start Date End Date Taking? Authorizing Provider   amitriptyline (ELAVIL) 25 MG tablet Take 1/2 tablet every other day x2 weeks then stop 11/16/22   Jair Orr APRN   atorvastatin (LIPITOR) 40 MG tablet Take 1 tablet by mouth Every Night. 9/23/22   Jair Orr APRN   calcium carbonate (OS-AIMEE) 1250 (500 Ca) MG tablet Take 1 tablet by mouth 2 (Two) Times a Day. 9/23/22   Jair Orr APRN   calcium carbonate, oyster shell, 500 MG tablet tablet  12/26/22   Provider, MD Francois   carvedilol (COREG) 3.125 MG tablet Take 1 tablet by mouth 2 (Two) Times a Day With Meals. 9/23/22   Jair Orr APRN   cholecalciferol (Cholecalciferol) 25 MCG (1000 UT) tablet Take 2 tablets by mouth Every Morning. 11/3/21   Ibeth Wilder APRN   multivitamin with minerals tablet tablet Take 1 tablet by mouth Daily.    Provider, MD Francois   NIFEdipine CC (ADALAT CC) 60 MG 24 hr tablet Take 1 tablet by mouth Daily. 9/23/22   Jair Orr APRN  "  pantoprazole (PROTONIX) 40 MG EC tablet Take 1 tablet by mouth Daily. 23   Jair Orr APRN   PEG-KCl-NaCl-NaSulf-Na Asc-C (MoviPrep) 100 g reconstituted solution powder Take 1,000 mL by mouth Every 12 (Twelve) Hours. 2/15/22   Estella Jose APRN   tolterodine LA (DETROL LA) 4 MG 24 hr capsule Take 1 capsule by mouth Daily. 22   Jair Orr APRN        Social History:   Social History     Tobacco Use   • Smoking status: Former     Packs/day: 1.00     Years: 30.00     Pack years: 30.00     Types: Cigarettes     Start date: 1973     Quit date: 2004     Years since quittin.2   • Smokeless tobacco: Never   • Tobacco comments:     former smoker   Vaping Use   • Vaping Use: Never used   Substance Use Topics   • Alcohol use: Yes     Alcohol/week: 1.0 standard drink     Types: 1 Glasses of wine per week     Comment: currently some days; occasionally dinks, less than 1 drink per day   • Drug use: Not Currently     Types: Marijuana     Comment: Only a couple off times when  i was around 18.         Review of Systems:  Review of Systems   Constitutional: Negative for chills and fever.   HENT: Positive for congestion. Negative for ear pain and sore throat.    Eyes: Negative for pain.   Respiratory: Negative for cough, chest tightness and shortness of breath.    Cardiovascular: Negative for chest pain.   Gastrointestinal: Positive for abdominal pain, diarrhea, nausea and vomiting.   Genitourinary: Negative for flank pain and hematuria.   Musculoskeletal: Negative for joint swelling.   Skin: Negative for pallor.   Neurological: Negative for seizures and headaches.   Hematological: Negative.    Psychiatric/Behavioral: Negative.    All other systems reviewed and are negative.       Physical Exam:  /87   Pulse 71   Temp 97.5 °F (36.4 °C) (Oral)   Resp 18   Ht 154.9 cm (61\")   Wt 86.4 kg (190 lb 7.6 oz)   SpO2 98%   BMI 35.99 kg/m²     Physical Exam  Vitals and nursing note " reviewed.   Constitutional:       General: She is not in acute distress.     Appearance: Normal appearance. She is not toxic-appearing.   HENT:      Head: Normocephalic and atraumatic.      Mouth/Throat:      Mouth: Mucous membranes are moist.   Eyes:      General: No scleral icterus.     Pupils: Pupils are equal, round, and reactive to light.   Cardiovascular:      Rate and Rhythm: Normal rate and regular rhythm.      Pulses: Normal pulses.      Heart sounds: Normal heart sounds.   Pulmonary:      Effort: Pulmonary effort is normal. No respiratory distress.      Breath sounds: Normal breath sounds.   Abdominal:      General: Bowel sounds are normal. There is no distension.      Palpations: Abdomen is soft.      Tenderness: There is abdominal tenderness in the epigastric area. There is no right CVA tenderness or left CVA tenderness.   Musculoskeletal:         General: Normal range of motion.      Cervical back: Normal range of motion and neck supple.   Skin:     General: Skin is warm and dry.   Neurological:      General: No focal deficit present.      Mental Status: She is alert and oriented to person, place, and time. Mental status is at baseline.   Psychiatric:         Mood and Affect: Mood normal.         Behavior: Behavior normal.             Procedures:  Procedures      Medical Decision Making:      Comorbidities that affect care:    Asthma, Hypertension, Obesity    External Notes reviewed:    Previous Operation Note: August GI note for admission with colonoscopy and polyp removal      The following orders were placed and all results were independently analyzed by me:  Orders Placed This Encounter   Procedures   • CT Abdomen Pelvis With Contrast   • [place] Draw   • Comprehensive Metabolic Panel   • Lipase   • Urinalysis With Microscopic If Indicated (No Culture) - Urine, Clean Catch   • Lactic Acid, Plasma   • CBC Auto Differential   • Urinalysis, Microscopic Only - Urine, Clean Catch   • Undress & Gown   •  CBC & Differential   • Green Top (Gel)   • Lavender Top   • Gold Top - SST   • Light Blue Top       Medications Given in the Emergency Department:  Medications   iopamidol (ISOVUE-370) 76 % injection 100 mL (100 mL Intravenous Given 3/13/23 1902)   aluminum-magnesium hydroxide-simethicone (MAALOX MAX) 400-400-40 MG/5ML suspension 15 mL (15 mL Oral Given 3/13/23 2013)   Lidocaine Viscous HCl (XYLOCAINE) 2 % solution 15 mL (15 mL Mouth/Throat Given 3/13/23 2013)   famotidine (PEPCID) injection 20 mg (20 mg Intravenous Given 3/13/23 2014)   ondansetron (ZOFRAN) injection 4 mg (4 mg Intravenous Given 3/13/23 2014)        ED Course:    The patient was initially evaluated in the triage area where orders were placed. The patient was later dispositioned by LATOSHA Hugo.      The patient was advised to stay for completion of workup which includes but is not limited to communication of labs and radiological results, reassessment and plan. The patient was advised that leaving prior to disposition by a provider could result in critical findings that are not communicated to the patient.     ED Course as of 03/16/23 0337   Mon Mar 13, 2023   2005 CT Abdomen Pelvis With Contrast  Possible colitis   [DS]   2047 Pain down to 1 [DS]      ED Course User Index  [DS] Jessica Morse APRN       Labs:    Lab Results (last 24 hours)     ** No results found for the last 24 hours. **           Imaging:    No Radiology Exams Resulted Within Past 24 Hours      Differential Diagnosis and Discussion:      Abdominal Pain: Based on the patient's signs and symptoms, I considered abdominal aortic aneurysm, small bowel obstruction, pancreatitis, acute cholecystitis, acute appendecitis, peptic ulcer disease, gastritis, colitis, endocrine disorders, irritable bowel syndrome and other differential diagnosis an etiology of the patient's abdominal pain.    All labs were reviewed and interpreted by me.  CT scan radiology interpretation was reviewed by  me.    MDM  Number of Diagnoses or Management Options  Colitis  Hiatal hernia  Diagnosis management comments: The patient is resting comfortably and feels better, is alert and in no distress. Repeat examination is unremarkable and benign; in particular, there's no discomfort at McBurney's point and there is no pulsatile mass. The history, exam, diagnostic testing, and current condition does not suggest acute appendicitis, bowel obstruction, acute cholecystitis, bowel perforation, major gastrointestinal bleeding, severe diverticulitis, abdominal aortic aneurysm, mesenteric ischemia, volvulus, sepsis, or other significant pathology that warrants further testing, continued ED treatment, admission, for surgical evaluation at this point. The vital signs have been stable. The patient does not have uncontrollable pain, intractable vomiting, or other significant symptoms. The patient's condition is stable and appropriate for discharge from the emergency department.         Amount and/or Complexity of Data Reviewed  Clinical lab tests: ordered and reviewed  Tests in the radiology section of CPT®: ordered and reviewed  Tests in the medicine section of CPT®: ordered and reviewed    Risk of Complications, Morbidity, and/or Mortality  Presenting problems: moderate  Diagnostic procedures: moderate  Management options: low    Patient Progress  Patient progress: stable       Patient Care Considerations:    NARCOTICS: I considered prescribing opiate pain medication as an outpatient, however They may increase the risk of constipation which would exacerbate abdominal pain      Consultants/Shared Management Plan:    None    Social Determinants of Health:    Patient is independent, reliable, and has access to care.       Disposition and Care Coordination:    Discharged: The patient is suitable and stable for discharge with no need for consideration of observation or admission.    I have explained the patient´s condition, diagnoses and  treatment plan based on the information available to me at this time. I have answered questions and addressed any concerns. The patient has a good  understanding of the patient´s diagnosis, condition, and treatment plan as can be expected at this point. The vital signs have been stable. The patient´s condition is stable and appropriate for discharge from the emergency department.      The patient will pursue further outpatient evaluation with the primary care physician or other designated or consulting physician as outlined in the discharge instructions. They are agreeable to this plan of care and follow-up instructions have been explained in detail. The patient has received these instructions in written format and have expressed an understanding of the discharge instructions. The patient is aware that any significant change in condition or worsening of symptoms should prompt an immediate return to this or the closest emergency department or call to 911.  I have explained discharge medications and the need for follow up with the patient/caretakers. This was also printed in the discharge instructions. Patient was discharged with the following medications and follow up:      Medication List      New Prescriptions    amoxicillin-clavulanate 875-125 MG per tablet  Commonly known as: AUGMENTIN  Take 1 tablet by mouth 2 (Two) Times a Day for 7 days.     dicyclomine 20 MG tablet  Commonly known as: BENTYL  Take 1 tablet by mouth Every 8 (Eight) Hours As Needed (abdominal pain).     metoclopramide 10 MG tablet  Commonly known as: REGLAN  Take 1 tablet by mouth 4 (Four) Times a Day Before Meals & at Bedtime for 30 days.     ondansetron ODT 4 MG disintegrating tablet  Commonly known as: ZOFRAN-ODT  Place 1 tablet on the tongue Every 8 (Eight) Hours As Needed for Nausea or Vomiting.           Where to Get Your Medications      These medications were sent to [facility] [place] [facility] - [place] - [address] - [phone]  -  [phone]  [address] [place] [address]    Phone: [phone]   · amoxicillin-clavulanate 875-125 MG per tablet  · dicyclomine 20 MG tablet  · metoclopramide 10 MG tablet  · ondansetron ODT 4 MG disintegrating tablet      Michael Clarke MD  [address] [place] [address] [phone]    Schedule an appointment as soon as possible for a visit          Final diagnoses:   Hiatal hernia   Colitis        ED Disposition     ED Disposition   Discharge    Condition   Stable    Comment   --             This medical record created using voice recognition software.           Jessica Morse, APRN  03/16/23 0335

## 2023-03-16 ENCOUNTER — OFFICE VISIT (OUTPATIENT)
Dept: FAMILY MEDICINE CLINIC | Facility: CLINIC | Age: 68
End: 2023-03-16
Payer: MEDICARE

## 2023-03-16 VITALS
OXYGEN SATURATION: 98 % | TEMPERATURE: 98.7 F | DIASTOLIC BLOOD PRESSURE: 82 MMHG | BODY MASS INDEX: 35.68 KG/M2 | HEART RATE: 78 BPM | SYSTOLIC BLOOD PRESSURE: 140 MMHG | HEIGHT: 61 IN | WEIGHT: 189 LBS

## 2023-03-16 DIAGNOSIS — E83.52 HYPERCALCEMIA: ICD-10-CM

## 2023-03-16 DIAGNOSIS — Z09 FOLLOW-UP EXAM: Primary | ICD-10-CM

## 2023-03-16 DIAGNOSIS — E66.9 CLASS 2 OBESITY WITHOUT SERIOUS COMORBIDITY WITH BODY MASS INDEX (BMI) OF 35.0 TO 35.9 IN ADULT, UNSPECIFIED OBESITY TYPE: ICD-10-CM

## 2023-03-16 DIAGNOSIS — K44.9 HIATAL HERNIA WITH GERD: ICD-10-CM

## 2023-03-16 DIAGNOSIS — K21.9 HIATAL HERNIA WITH GERD: ICD-10-CM

## 2023-03-16 DIAGNOSIS — N32.81 OVERACTIVE BLADDER: ICD-10-CM

## 2023-03-16 DIAGNOSIS — R13.10 DYSPHAGIA, UNSPECIFIED TYPE: ICD-10-CM

## 2023-03-16 PROCEDURE — 99214 OFFICE O/P EST MOD 30 MIN: CPT | Performed by: NURSE PRACTITIONER

## 2023-03-16 PROCEDURE — 1159F MED LIST DOCD IN RCRD: CPT | Performed by: NURSE PRACTITIONER

## 2023-03-16 PROCEDURE — 3077F SYST BP >= 140 MM HG: CPT | Performed by: NURSE PRACTITIONER

## 2023-03-16 PROCEDURE — 3079F DIAST BP 80-89 MM HG: CPT | Performed by: NURSE PRACTITIONER

## 2023-03-16 NOTE — PROGRESS NOTES
Chief Complaint  ER Visit ([facility] 03/13/2023, Dx: Hiatal hernia/Colitis) and Med Management    Subjective        Medical History: has a past medical history of Acid reflux disease, Allergic (March 2022), Anemia, Anxiety, Arthritis, Asthma, Bladder disorder, Colon polyp (09/20/2018), Depression (01/14/2020), Essential hypertension (02/14/2020), H/O hernia repair, Hearing loss, Hemorrhoids, Hyperlipemia, Hyperlipidemia, Inflammatory bowel disease (3/13/2023), OAB (overactive bladder) (02/14/2020), Obesity (?), Pneumonia (04/05/2014), Shortness of breath, and Visual impairment (?).     Surgical History: has a past surgical history that includes Appendectomy; Joint replacement; Back surgery; Breast biopsy (Left); Esophagogastroduodenoscopy (2018); Hernia repair; knee arthroplasty, partial replacement; Spine surgery; Tonsillectomy; Fracture surgery (12-); Colonoscopy (09/20/2018); and Colonoscopy (N/A, 8/19/2022).     Family History: family history includes Anxiety disorder in her daughter; Breast cancer in her mother; Cancer in her mother; Early death in her brother and son; Hearing loss in her father; Heart disease in her brother, brother, father, and son; Hyperlipidemia in her father; Other in her mother; Vision loss in her brother, father, and mother.     Social History: reports that she quit smoking about 18 years ago. Her smoking use included cigarettes. She started smoking about 50 years ago. She has a 30.00 pack-year smoking history. She has never used smokeless tobacco. She reports current alcohol use of about 1.0 standard drink per week. She reports that she does not currently use drugs after having used the following drugs: Marijuana.    Vanna Gil presents to [facility]  Urinary Frequency   The current episode started more than 1 year ago. The problem has been waxing and waning. The patient is experiencing no pain. There has been no fever. Associated symptoms include  "frequency and urgency. Treatments tried: tolterodine 4mg. The treatment provided no relief.     Patient was seen in the emergency room 3/13/2023 with complaints of abdominal pain nausea vomiting and diarrhea.  CT abdomen and pelvis was done that was suggestive of colitis with a moderate hiatal hernia.  Patient was discharged home on Augmentin, dicyclomine, Zofran, and Reglan.  She comes in today for follow-up. Patient feels like food does not want to pass. She will develop an episode of nausea and vomiting, abdominal pain with heart burn monthly.  Patient is seeing Dr. Patel gastroenterology in the past.  She does have a history of hernia repair partial Juan in 2019.    Objective   Vital Signs:   /82 (BP Location: Left arm, Patient Position: Sitting, Cuff Size: Adult)   Pulse 78   Temp 98.7 °F (37.1 °C) (Temporal)   Ht 154.9 cm (61\")   Wt 85.7 kg (189 lb)   SpO2 98%   BMI 35.71 kg/m²       Wt Readings from Last 3 Encounters:   03/16/23 85.7 kg (189 lb)   03/13/23 86.4 kg (190 lb 7.6 oz)   02/21/23 87.1 kg (192 lb)        BP Readings from Last 3 Encounters:   03/16/23 140/82   03/13/23 143/87   02/21/23 118/74        Class 2 Severe Obesity (BMI >=35 and <=39.9). Obesity-related health conditions include the following: hypertension, dyslipidemias and osteoarthritis. Obesity is newly identified. BMI is is above average; BMI management plan is completed. We discussed low calorie, low carb based diet program, portion control and increasing exercise.       Physical Exam  Vitals reviewed.   Constitutional:       Appearance: Normal appearance. She is well-developed. She is obese.   HENT:      Head: Normocephalic and atraumatic.   Eyes:      Conjunctiva/sclera: Conjunctivae normal.      Pupils: Pupils are equal, round, and reactive to light.   Cardiovascular:      Rate and Rhythm: Normal rate and regular rhythm.      Heart sounds: No murmur heard.  Pulmonary:      Effort: Pulmonary effort is normal.      " Breath sounds: Normal breath sounds. No wheezing or rhonchi.   Skin:     General: Skin is warm and dry.   Neurological:      Mental Status: She is alert and oriented to person, place, and time.   Psychiatric:         Mood and Affect: Mood and affect normal.         Behavior: Behavior normal.         Thought Content: Thought content normal.         Judgment: Judgment normal.        Result Review :  {The following data was reviewed by LATOSHA Watters on 03/16/2023.  Common labs    Common Labs 6/29/22 6/29/22 3/13/23 3/13/23    0956 0956 1721 1721   Glucose  89  110 (A)   BUN  15  22   Creatinine  0.72  0.91   Sodium  142  139   Potassium  4.0  3.9   Chloride  104  100   Calcium  9.6  10.7 (A)   Albumin  4.30  4.5   Total Bilirubin  0.4  0.4   Alkaline Phosphatase  99  129 (A)   AST (SGOT)  21  29   ALT (SGPT)  15  22   WBC 4.74  7.48    Hemoglobin 13.6  14.3    Hematocrit 40.7  40.2    Platelets 265  265    (A) Abnormal value            Data reviewed: GI studies Recent colonoscopy and previous endoscopy            Current Outpatient Medications on File Prior to Visit   Medication Sig Dispense Refill   • amoxicillin-clavulanate (AUGMENTIN) 875-125 MG per tablet Take 1 tablet by mouth 2 (Two) Times a Day for 7 days. 14 tablet 0   • atorvastatin (LIPITOR) 40 MG tablet Take 1 tablet by mouth Every Night. 90 tablet 3   • calcium carbonate (OS-AIMEE) 1250 (500 Ca) MG tablet Take 1 tablet by mouth 2 (Two) Times a Day. 180 tablet 3   • carvedilol (COREG) 3.125 MG tablet Take 1 tablet by mouth 2 (Two) Times a Day With Meals. 180 tablet 3   • cholecalciferol (Cholecalciferol) 25 MCG (1000 UT) tablet Take 2 tablets by mouth Every Morning. 180 tablet 3   • metoclopramide (REGLAN) 10 MG tablet Take 1 tablet by mouth 4 (Four) Times a Day Before Meals & at Bedtime for 30 days. 120 tablet 0   • multivitamin with minerals tablet tablet Take 1 tablet by mouth Daily.     • NIFEdipine CC (ADALAT CC) 60 MG 24 hr tablet Take 1  tablet by mouth Daily. 90 tablet 3   • pantoprazole (PROTONIX) 40 MG EC tablet Take 1 tablet by mouth Daily. 90 tablet 1   • [DISCONTINUED] tolterodine LA (DETROL LA) 4 MG 24 hr capsule Take 1 capsule by mouth Daily. 90 capsule 3   • amitriptyline (ELAVIL) 25 MG tablet Take 1/2 tablet every other day x2 weeks then stop (Patient not taking: Reported on 3/16/2023) 30 tablet 0   • dicyclomine (BENTYL) 20 MG tablet Take 1 tablet by mouth Every 8 (Eight) Hours As Needed (abdominal pain). (Patient not taking: Reported on 3/16/2023) 30 tablet 0   • ondansetron ODT (ZOFRAN-ODT) 4 MG disintegrating tablet Place 1 tablet on the tongue Every 8 (Eight) Hours As Needed for Nausea or Vomiting. (Patient not taking: Reported on 3/16/2023) 15 tablet 0   • [DISCONTINUED] calcium carbonate, oyster shell, 500 MG tablet tablet      • [DISCONTINUED] PEG-KCl-NaCl-NaSulf-Na Asc-C (MoviPrep) 100 g reconstituted solution powder Take 1,000 mL by mouth Every 12 (Twelve) Hours. (Patient not taking: Reported on 3/16/2023) 1 each 0     No current facility-administered medications on file prior to visit.        Assessment and Plan  Diagnoses and all orders for this visit:    1. Follow-up exam (Primary)    2. Hypercalcemia  Comments:  We will recheck labs next week if remains elevated will decrease the amount of calcium she is taking through vitamins.  Orders:  -     Basic metabolic panel; Future    3. Overactive bladder  Comments:  We will stop Detrol LA and start on Myrbetriq patient given samples in office will call in 3 weeks and let us know how things are going    4. Hiatal hernia with GERD  Comments:  Will refer back over to Dr. Patel.  Orders:  -     Ambulatory Referral to Gastroenterology    5. Dysphagia, unspecified type  Comments:  Will refer back over to Dr. Patel gastroenterology for further work-up and management dysphagia, unspecified type  Orders:  -     Ambulatory Referral to Gastroenterology    6. Class 2 obesity without  serious comorbidity with body mass index (BMI) of 35.0 to 35.9 in adult, unspecified obesity type    Other orders  -     Mirabegron ER (Myrbetriq) 25 MG tablet sustained-release 24 hour 24 hr tablet; Take 1 tablet by mouth Daily.  Dispense: 30 tablet; Refill: 0        Follow Up   Return in about 3 months (around 6/16/2023) for Recheck.  Patient was given instructions and counseling regarding her condition or for health maintenance advice. Please see specific information pulled into the AVS if appropriate.       Part of this note may be electronic transcription/translation of spoken language to printed text using the Dragon dictation system

## 2023-03-21 ENCOUNTER — LAB (OUTPATIENT)
Dept: FAMILY MEDICINE CLINIC | Facility: CLINIC | Age: 68
End: 2023-03-21
Payer: MEDICARE

## 2023-03-21 DIAGNOSIS — E83.52 HYPERCALCEMIA: ICD-10-CM

## 2023-03-21 LAB
ANION GAP SERPL CALCULATED.3IONS-SCNC: 8 MMOL/L (ref 5–15)
BUN SERPL-MCNC: 19 MG/DL (ref 8–23)
BUN/CREAT SERPL: 21.6 (ref 7–25)
CALCIUM SPEC-SCNC: 9.5 MG/DL (ref 8.6–10.5)
CHLORIDE SERPL-SCNC: 104 MMOL/L (ref 98–107)
CO2 SERPL-SCNC: 26 MMOL/L (ref 22–29)
CREAT SERPL-MCNC: 0.88 MG/DL (ref 0.57–1)
EGFRCR SERPLBLD CKD-EPI 2021: 72.1 ML/MIN/1.73
GLUCOSE SERPL-MCNC: 88 MG/DL (ref 65–99)
POTASSIUM SERPL-SCNC: 4.1 MMOL/L (ref 3.5–5.2)
SODIUM SERPL-SCNC: 138 MMOL/L (ref 136–145)

## 2023-03-21 PROCEDURE — 80048 BASIC METABOLIC PNL TOTAL CA: CPT | Performed by: NURSE PRACTITIONER

## 2023-04-05 ENCOUNTER — TELEPHONE (OUTPATIENT)
Dept: FAMILY MEDICINE CLINIC | Facility: CLINIC | Age: 68
End: 2023-04-05

## 2023-04-06 NOTE — TELEPHONE ENCOUNTER
Caller: Vanna Gil    Relationship: Self    Best call back number: [phone] OR  [phone]    Who are you requesting to speak with (clinical staff, provider,  specific staff member): NAVI BLANC    What was the call regarding: PATIENT STATES THE NEW MEDICATION ARENT HELPING. PATIENT STATES SHE WANTS TO KNOW IF WE ADDED THE LIVING WILL SHE BROUGHT TO HER ACCOUNT. PATIENT STATES SHE WOULD ALSO LIKE A CALL BACK TO ASK SOME OTHER QUESTIONS THAT SHE DOESN'T REMEMBER AT THIS TIME.    Do you require a callback: YES  
Called and notified patient of PCP guidance. Patient verbalized understanding and will stop by clinic to receive Rx samples.  
none

## 2023-04-06 NOTE — TELEPHONE ENCOUNTER
Patient notified and informed of Rx samples, approved by PCP.    MYRBETRIQ 50MG   LOT [id number]  EXP 04/2024  x3 boxes

## 2023-04-12 ENCOUNTER — TELEPHONE (OUTPATIENT)
Dept: FAMILY MEDICINE CLINIC | Facility: CLINIC | Age: 68
End: 2023-04-12

## 2023-04-12 NOTE — TELEPHONE ENCOUNTER
Caller: Vanna Gil    Relationship: Self    Best call back number: [phone]    What medications are you currently taking:   Current Outpatient Medications on File Prior to Visit   Medication Sig Dispense Refill   • amitriptyline (ELAVIL) 25 MG tablet Take 1/2 tablet every other day x2 weeks then stop (Patient not taking: Reported on 3/16/2023) 30 tablet 0   • atorvastatin (LIPITOR) 40 MG tablet Take 1 tablet by mouth Every Night. 90 tablet 3   • calcium carbonate (OS-AIMEE) 1250 (500 Ca) MG tablet Take 1 tablet by mouth 2 (Two) Times a Day. 180 tablet 3   • carvedilol (COREG) 3.125 MG tablet Take 1 tablet by mouth 2 (Two) Times a Day With Meals. 180 tablet 3   • cholecalciferol (Cholecalciferol) 25 MCG (1000 UT) tablet Take 2 tablets by mouth Every Morning. 180 tablet 3   • dicyclomine (BENTYL) 20 MG tablet Take 1 tablet by mouth Every 8 (Eight) Hours As Needed (abdominal pain). (Patient not taking: Reported on 3/16/2023) 30 tablet 0   • metoclopramide (REGLAN) 10 MG tablet Take 1 tablet by mouth 4 (Four) Times a Day Before Meals & at Bedtime for 30 days. 120 tablet 0   • Mirabegron ER (Myrbetriq) 25 MG tablet sustained-release 24 hour 24 hr tablet Take 1 tablet by mouth Daily. 30 tablet 0   • Mirabegron ER (Myrbetriq) 50 MG tablet sustained-release 24 hour 24 hr tablet Take 50 mg by mouth Daily. 7 tablet 0   • multivitamin with minerals tablet tablet Take 1 tablet by mouth Daily.     • NIFEdipine CC (ADALAT CC) 60 MG 24 hr tablet Take 1 tablet by mouth Daily. 90 tablet 3   • ondansetron ODT (ZOFRAN-ODT) 4 MG disintegrating tablet Place 1 tablet on the tongue Every 8 (Eight) Hours As Needed for Nausea or Vomiting. (Patient not taking: Reported on 3/16/2023) 15 tablet 0   • pantoprazole (PROTONIX) 40 MG EC tablet Take 1 tablet by mouth Daily. 90 tablet 1     No current facility-administered medications on file prior to visit.          When did you start taking these medications:  ABOUT A WEEK AGO     Which  medication are you concerned about: Mirabegron ER (Myrbetriq) 50 MG tablet sustained-release 24 hour 24 hr tablet    Who prescribed you this medication: Jair Orr APRN     What are your concerns:  LEGS SWELLING, GRINDING OF TEETH     How long have you had these concerns: LAST Saturday     NO APPOINTMENT AVAILABLE UNTIL MAY

## 2023-04-24 ENCOUNTER — OFFICE VISIT (OUTPATIENT)
Dept: FAMILY MEDICINE CLINIC | Facility: CLINIC | Age: 68
End: 2023-04-24
Payer: MEDICARE

## 2023-04-24 VITALS
SYSTOLIC BLOOD PRESSURE: 118 MMHG | WEIGHT: 190.8 LBS | TEMPERATURE: 97.2 F | HEIGHT: 61 IN | OXYGEN SATURATION: 98 % | DIASTOLIC BLOOD PRESSURE: 68 MMHG | BODY MASS INDEX: 36.02 KG/M2 | HEART RATE: 69 BPM

## 2023-04-24 DIAGNOSIS — Z00.00 ENCOUNTER FOR SUBSEQUENT ANNUAL WELLNESS VISIT (AWV) IN MEDICARE PATIENT: Primary | ICD-10-CM

## 2023-04-24 DIAGNOSIS — N32.81 OAB (OVERACTIVE BLADDER): ICD-10-CM

## 2023-04-24 NOTE — PROGRESS NOTES
The ABCs of the Annual Wellness Visit  Subsequent Medicare Wellness Visit    Subjective    Vanna Gil is a 67 y.o. female who presents for a Subsequent Medicare Wellness Visit.    The following portions of the patient's history were reviewed and   updated as appropriate: allergies, current medications, past family history, past medical history, past social history, past surgical history and problem list.    Compared to one year ago, the patient feels her physical   health is the same.    Compared to one year ago, the patient feels her mental   health is the same.    Recent Hospitalizations:  She was not admitted to the hospital during the last year.       Current Medical Providers:  Patient Care Team:  Jair Orr APRN as PCP - General (Nurse Practitioner)    Outpatient Medications Prior to Visit   Medication Sig Dispense Refill   • amitriptyline (ELAVIL) 25 MG tablet Take 1/2 tablet every other day x2 weeks then stop 30 tablet 0   • atorvastatin (LIPITOR) 40 MG tablet Take 1 tablet by mouth Every Night. 90 tablet 3   • calcium carbonate (OS-AIMEE) 1250 (500 Ca) MG tablet Take 1 tablet by mouth 2 (Two) Times a Day. 180 tablet 3   • carvedilol (COREG) 3.125 MG tablet Take 1 tablet by mouth 2 (Two) Times a Day With Meals. 180 tablet 3   • cholecalciferol (Cholecalciferol) 25 MCG (1000 UT) tablet Take 2 tablets by mouth Every Morning. 180 tablet 3   • dicyclomine (BENTYL) 20 MG tablet Take 1 tablet by mouth Every 8 (Eight) Hours As Needed (abdominal pain). 30 tablet 0   • Mirabegron ER (Myrbetriq) 50 MG tablet sustained-release 24 hour 24 hr tablet Take 50 mg by mouth Daily. 7 tablet 0   • multivitamin with minerals tablet tablet Take 1 tablet by mouth Daily.     • NIFEdipine CC (ADALAT CC) 60 MG 24 hr tablet Take 1 tablet by mouth Daily. 90 tablet 3   • pantoprazole (PROTONIX) 40 MG EC tablet Take 1 tablet by mouth Daily. 90 tablet 1   • Mirabegron ER (Myrbetriq) 25 MG tablet sustained-release 24 hour 24  "hr tablet Take 1 tablet by mouth Daily. 30 tablet 0   • ondansetron ODT (ZOFRAN-ODT) 4 MG disintegrating tablet Place 1 tablet on the tongue Every 8 (Eight) Hours As Needed for Nausea or Vomiting. (Patient not taking: Reported on 3/16/2023) 15 tablet 0     No facility-administered medications prior to visit.       No opioid medication identified on active medication list. I have reviewed chart for other potential  high risk medication/s and harmful drug interactions in the elderly.          Aspirin is not on active medication list.  Aspirin use is not indicated based on review of current medical condition/s. Risk of harm outweighs potential benefits.  .    Patient Active Problem List   Diagnosis   • Elevated glucose level   • Gastroesophageal reflux disease   • Essential hypertension   • Depression   • Asthma   • Arthritis   • Anxiety   • Anemia   • Abdominal hernia   • Hearing loss   • Hyperlipidemia   • Hypertonicity of bladder   • Postmenopausal   • Eustachian tube dysfunction, left   • History of colon polyps   • Radiculopathy of cervical spine     Advance Care Planning   Advance Care Planning     Advance Directive is not on file.  ACP discussion was held with the patient during this visit. Patient does not have an advance directive, information provided.     Objective    Vitals:    04/24/23 1423   BP: 118/68   Pulse: 69   Temp: 97.2 °F (36.2 °C)   SpO2: 98%   Weight: 86.5 kg (190 lb 12.8 oz)   Height: 154.9 cm (61\")     Estimated body mass index is 36.05 kg/m² as calculated from the following:    Height as of this encounter: 154.9 cm (61\").    Weight as of this encounter: 86.5 kg (190 lb 12.8 oz).    Class 2 Severe Obesity (BMI >=35 and <=39.9). Obesity-related health conditions include the following: hypertension and dyslipidemias. Obesity is improving with treatment. BMI is is above average; no BMI management plan is appropriate. We discussed portion control and increasing exercise.      Does the patient have " evidence of cognitive impairment? No          HEALTH RISK ASSESSMENT    Smoking Status:  Social History     Tobacco Use   Smoking Status Former   • Packs/day: 0.00   • Years: 30.00   • Pack years: 0.00   • Types: Cigarettes   • Start date: 1973   • Quit date: 2004   • Years since quittin.4   Smokeless Tobacco Never   Tobacco Comments    former smoker     Alcohol Consumption:  Social History     Substance and Sexual Activity   Alcohol Use Yes   • Alcohol/week: 1.0 standard drink   • Types: 1 Glasses of wine per week    Comment: currently some days; occasionally dinks, less than 1 drink per day     Fall Risk Screen:    ZENA Fall Risk Assessment has not been completed.    Depression Screenin/24/2023     2:19 PM   PHQ-2/PHQ-9 Depression Screening   Little Interest or Pleasure in Doing Things 0-->not at all   Feeling Down, Depressed or Hopeless 0-->not at all   PHQ-9: Brief Depression Severity Measure Score 0       Health Habits and Functional and Cognitive Screenin/24/2023     2:00 PM   Functional & Cognitive Status   Do you have difficulty preparing food and eating? No   Do you have difficulty bathing yourself, getting dressed or grooming yourself? No   Do you have difficulty using the toilet? No   Do you have difficulty moving around from place to place? No   Do you have trouble with steps or getting out of a bed or a chair? No   Current Diet Well Balanced Diet   Dental Exam Up to date   Eye Exam Up to date   Exercise (times per week) 7 times per week   Current Exercises Include Walking   Do you need help using the phone?  No   Are you deaf or do you have serious difficulty hearing?  Yes   Do you need help with transportation? No   Do you need help shopping? No   Do you need help preparing meals?  No   Do you need help with housework?  No   Do you need help with laundry? No   Do you need help taking your medications? No   Do you need help managing money? No   Do you ever drive or ride  in a car without wearing a seat belt? No   Have you felt unusual stress, anger or loneliness in the last month? No   Who do you live with? Alone   If you need help, do you have trouble finding someone available to you? No   Have you been bothered in the last four weeks by sexual problems? No   Do you have difficulty concentrating, remembering or making decisions? No       Age-appropriate Screening Schedule:  Refer to the list below for future screening recommendations based on patient's age, sex and/or medical conditions. Orders for these recommended tests are listed in the plan section. The patient has been provided with a written plan.    Health Maintenance   Topic Date Due   • LIPID PANEL  03/11/2023   • INFLUENZA VACCINE  08/01/2023   • MAMMOGRAM  12/01/2023   • ANNUAL WELLNESS VISIT  04/24/2024   • DXA SCAN  11/09/2024   • COLORECTAL CANCER SCREENING  08/19/2025   • TDAP/TD VACCINES (2 - Tdap) 06/19/2030   • HEPATITIS C SCREENING  Completed   • COVID-19 Vaccine  Completed   • Pneumococcal Vaccine 65+  Completed   • ZOSTER VACCINE  Completed                  CMS Preventative Services Quick Reference  Risk Factors Identified During Encounter  Fall Risk-High or Moderate: Discussed Fall Prevention in the home  The above risks/problems have been discussed with the patient.  Pertinent information has been shared with the patient in the After Visit Summary.  An After Visit Summary and PPPS were made available to the patient.    Follow Up:   Next Medicare Wellness visit to be scheduled in 1 year.       Additional E&M Note during same encounter follows:  Patient has multiple medical problems which are significant and separately identifiable that require additional work above and beyond the Medicare Wellness Visit.      Chief Complaint  Medicare Wellness-subsequent    Subjective        HPI  Vanna Gil is also being seen today for Over Active Bladder  Urinary Frequency   The current episode started more than 1 year  "ago. The problem has been waxing and waning. The patient is experiencing no pain. There has been no fever. Associated symptoms include frequency and urgency. Treatments tried: tolterodine 4mg. The treatment provided no relief.   At last visit patient was started on Myrbetriq comes in today for follow-up     Patient states medication is working well, she still having accidents but nothing like she was.  She would like to continue on the current dose of medication.    Objective   Vital Signs:  /68   Pulse 69   Temp 97.2 °F (36.2 °C)   Ht 154.9 cm (61\")   Wt 86.5 kg (190 lb 12.8 oz)   SpO2 98%   BMI 36.05 kg/m²     Physical Exam  Vitals reviewed.   Constitutional:       Appearance: Normal appearance. She is well-developed.   HENT:      Head: Normocephalic and atraumatic.   Eyes:      Conjunctiva/sclera: Conjunctivae normal.      Pupils: Pupils are equal, round, and reactive to light.   Cardiovascular:      Rate and Rhythm: Normal rate and regular rhythm.      Heart sounds: No murmur heard.  Pulmonary:      Effort: Pulmonary effort is normal.      Breath sounds: Normal breath sounds. No wheezing or rhonchi.   Skin:     General: Skin is warm and dry.   Neurological:      Mental Status: She is alert and oriented to person, place, and time.   Psychiatric:         Mood and Affect: Mood and affect normal.         Behavior: Behavior normal.         Thought Content: Thought content normal.         Judgment: Judgment normal.          The following data was reviewed by: LATOSHA Watters on 04/24/2023:  Common labs        6/29/2022    09:56 3/13/2023    17:21 3/21/2023    10:51   Common Labs   Glucose 89   110   88     BUN 15   22   19     Creatinine 0.72   0.91   0.88     Sodium 142   139   138     Potassium 4.0   3.9   4.1     Chloride 104   100   104     Calcium 9.6   10.7   9.5     Albumin 4.30   4.5      Total Bilirubin 0.4   0.4      Alkaline Phosphatase 99   129      AST (SGOT) 21   29      ALT (SGPT) " 15   22      WBC 4.74   7.48      Hemoglobin 13.6   14.3      Hematocrit 40.7   40.2      Platelets 265   265        Data reviewed: Previous office note           Assessment and Plan   Diagnoses and all orders for this visit:    1. Encounter for subsequent annual wellness visit (AWV) in Medicare patient (Primary)    2. OAB (overactive bladder)  Comments:  We will send over refill for my Mybetrik will likely need a prior authorization patient also given samples discussed return precautions patient verbalized under  Orders:  -     Mirabegron ER (Myrbetriq) 50 MG tablet sustained-release 24 hour 24 hr tablet; Take 50 mg by mouth Daily.  Dispense: 90 tablet; Refill: 1    Other orders  -     Cancel: Comprehensive metabolic panel; Future  -     Diclofenac Sodium (VOLTAREN) 1 % gel gel; Apply 4 g topically to the appropriate area as directed 4 (Four) Times a Day As Needed (pain).  Dispense: 100 g; Refill: 1  -     Cancel: Comprehensive metabolic panel    Patient states she is moving to [place] in in 2 months to be with her daughter and grandchildren.  Patient was given copies of DEXA scan, mammogram, latest labs, colonoscopy, and back x-ray.         Follow Up   Return in about 6 months (around 10/24/2023) for Recheck.  Patient was given instructions and counseling regarding her condition or for health maintenance advice. Please see specific information pulled into the AVS if appropriate.

## 2023-05-08 ENCOUNTER — TELEPHONE (OUTPATIENT)
Dept: FAMILY MEDICINE CLINIC | Facility: CLINIC | Age: 68
End: 2023-05-08
Payer: MEDICARE

## 2023-05-08 NOTE — TELEPHONE ENCOUNTER
Pharmacy is asking if we ever received the PA for this patient's Mirabegron ER (Myrbetriq) 50 MG tablet sustained-release 24 . When they receive, please fax to [phone]

## 2023-05-15 ENCOUNTER — TELEPHONE (OUTPATIENT)
Dept: FAMILY MEDICINE CLINIC | Facility: CLINIC | Age: 68
End: 2023-05-15
Payer: MEDICARE

## 2023-05-15 NOTE — TELEPHONE ENCOUNTER
Patient [place] out [place] in [place] and is requesting the following medications to be called into the [facility]. Patient would like a call back when sent to pharmacy   Call back # [phone]    calcium carbonate (OS-AIMEE) 1250 (500 Ca) MG tablet    carvedilol (COREG) 3.125 MG tablet    NIFEdipine CC (ADALAT CC) 60 MG 24 hr tablet    atorvastatin (LIPITOR) 40 MG tablet    pantoprazole (PROTONIX) 40 MG EC tablet    Mirabegron ER (Myrbetriq) 50 MG tablet sustained-release 24 hour 24 hr tablet    multivitamin with minerals tablet tablet    Diclofenac Sodium (VOLTAREN) 1 % gel gel    Vitamin D3 50 MG (2000IU) soft gel tablets

## 2023-05-16 DIAGNOSIS — N32.81 OAB (OVERACTIVE BLADDER): ICD-10-CM

## 2023-05-16 RX ORDER — PANTOPRAZOLE SODIUM 40 MG/1
40 TABLET, DELAYED RELEASE ORAL DAILY
Qty: 90 TABLET | Refills: 1 | Status: SHIPPED | OUTPATIENT
Start: 2023-05-16

## 2023-05-16 RX ORDER — CARVEDILOL 3.12 MG/1
3.12 TABLET ORAL 2 TIMES DAILY WITH MEALS
Qty: 180 TABLET | Refills: 3 | Status: SHIPPED | OUTPATIENT
Start: 2023-05-16

## 2023-05-16 RX ORDER — ATORVASTATIN CALCIUM 40 MG/1
40 TABLET, FILM COATED ORAL NIGHTLY
Qty: 90 TABLET | Refills: 3 | Status: SHIPPED | OUTPATIENT
Start: 2023-05-16

## 2023-05-16 RX ORDER — MULTIPLE VITAMINS W/ MINERALS TAB 9MG-400MCG
1 TAB ORAL DAILY
Qty: 90 TABLET | Refills: 0 | Status: SHIPPED | OUTPATIENT
Start: 2023-05-16

## 2023-05-16 RX ORDER — MELATONIN
2000 EVERY MORNING
Qty: 180 TABLET | Refills: 3 | Status: SHIPPED | OUTPATIENT
Start: 2023-05-16

## 2023-05-16 RX ORDER — NIFEDIPINE 60 MG/1
60 TABLET, FILM COATED, EXTENDED RELEASE ORAL DAILY
Qty: 90 TABLET | Refills: 3 | Status: SHIPPED | OUTPATIENT
Start: 2023-05-16

## 2023-05-16 RX ORDER — IBUPROFEN 200 MG
1 CAPSULE ORAL 2 TIMES DAILY
Qty: 180 TABLET | Refills: 3 | Status: SHIPPED | OUTPATIENT
Start: 2023-05-16

## 2023-05-18 ENCOUNTER — PRIOR AUTHORIZATION (OUTPATIENT)
Dept: FAMILY MEDICINE CLINIC | Facility: CLINIC | Age: 68
End: 2023-05-18
Payer: MEDICARE

## 2023-05-18 ENCOUNTER — TELEPHONE (OUTPATIENT)
Dept: FAMILY MEDICINE CLINIC | Facility: CLINIC | Age: 68
End: 2023-05-18
Payer: MEDICARE

## 2023-05-18 NOTE — TELEPHONE ENCOUNTER
PHARMACY CONTACTED AND EXP GIVEN TO PHARMACY VIA PHONE CALL. CARLOS AT [facility] SAID NOTHING ELSE WAS NEEDED AT THIS TIME

## 2023-05-18 NOTE — TELEPHONE ENCOUNTER
PATIENT NEEDS MA TO CONTACT PHARMACY WITH AN EXPIRATION DATE FOR    Mirabegron ER (Myrbetriq) 50 MG tablet

## 2023-05-18 NOTE — TELEPHONE ENCOUNTER
PA APPROVAL FOR Mirabegron ER (Myrbetriq) 50 MG tablet sustained-release 24 hour 24 hr tablet    FROM 4/3/2023   TO 12/31/2099

## (undated) DEVICE — COLON KIT: Brand: MEDLINE INDUSTRIES, INC.

## (undated) DEVICE — SOL IRRG H2O PL/BG 1000ML STRL

## (undated) DEVICE — SNAR E/S POLYP SNAREMASTER OVL/10MM 2.8X2300MM YEL

## (undated) DEVICE — Device: Brand: DEFENDO AIR/WATER/SUCTION AND BIOPSY VALVE

## (undated) DEVICE — TRAP,MUCUS SPECIMEN,40CC: Brand: MEDLINE